# Patient Record
Sex: MALE | Race: BLACK OR AFRICAN AMERICAN | NOT HISPANIC OR LATINO | Employment: OTHER | URBAN - METROPOLITAN AREA
[De-identification: names, ages, dates, MRNs, and addresses within clinical notes are randomized per-mention and may not be internally consistent; named-entity substitution may affect disease eponyms.]

---

## 2022-03-30 ENCOUNTER — TELEPHONE (OUTPATIENT)
Dept: TRANSPLANT | Facility: CLINIC | Age: 72
End: 2022-03-30

## 2022-03-30 NOTE — TELEPHONE ENCOUNTER
"Patient returned call, he reports that he and his wife are on the call (Dolores Qureshi), that they are understanding that he may not proceed with a referral unless he has a potential living donor. They state that his stepson (Uriel) is interested in this possibility, in addition to his wife. The patient is aware the next step will be to request insurance authorization, and then he will be contacted to schedule initial testing. He is aware that he will need a caregiver present, and that more than one person may sign up as a possible donor.     Contacted patient to review initial intake information. Patient reports the followin. Can you walk up a flight of stairs without getting short of breath or stopping? Absolutely ("I was out doing the yard")  2. Can you walk one block without getting short of breath or having to stop? Yes  3. Do you use oxygen? No  4. Do you use a cane, walker, or wheel chair to assist in mobility? No   5. Have you been hospitalized or had recent surgery in the last 6 months? -    A. Stroke - no   B. Heart surgery or heart catheterization - no   C. Broken bone - no  6. Do you have any cuts, open sores (ulcers), or wounds anywhere on your body? If yes, where and for how long? (no)  7. Do you go to dialysis? What days? Dialysis nightly, off on  night   8. Preferred appointment day?  probably  9. Caregiver? Wife Shawn (and Uriel dunham)  10. Best time to call? Anytime after 10 am    Patient is aware the next steps will include completing records and compliance verification. Patient is aware once provider review and insurance authorization is received we will contact patient to schedule initial visit. Patient is aware that initial visit will begin prior to / at 7 am and will conclude at approximately 3 pm on date of appointment. All questions answered at this time.    _________________________________  Unable to reach patient, attempted x 2. Unable to leave voicemail. " "Contacted FERNANDEZ SUNSHINE (Jessie), she states the patient is very interested in transplant, that he maybe could ask a Jew member about donation. She stated "you would not believe his age if you met him", that he "takes very good care of himself".     Plan agreed upon by FERNANDEZ SUNSHINE: she will attempt to reach patient, and requests a letter be sent regarding decline if no living donor (due to age and comorbidities). All questions answered, phone number to transplant center provided to follow up with this coordinator. Status pending.   ----- Message from Shira Epperson MD sent at 3/30/2022  2:02 PM CDT -----  Regarding: RE: please advise if ok to proceed with referral to RR  Not candidate if pt has no living donor.   ----- Message -----  From: Cinthya Cuadra  Sent: 3/30/2022   1:04 PM CDT  To: Shira Epperson MD  Subject: RE: please advise if ok to proceed with refe#    5 months on dialysis (PD), no living donor noted on referral.    Thank you for reviewing!  ----- Message -----  From: Shira Epperson MD  Sent: 3/30/2022  12:19 PM CDT  To: Cinthya Cuadra  Subject: RE: please advise if ok to proceed with refe#    his CHF is not really bad at all. We can evaluate the patient.  Does he have any living donor? Is he on HD? If so how many years?   ----- Message -----  From: Cinthya Cuadra  Sent: 3/30/2022  12:15 PM CDT  To: Shira Epperson MD  Subject: please advise if ok to proceed with referral#    Dr. Epperson,     This patient is a 71 year old male with a past medical history including DM, HTN, CHF (though most recent echo 3/28/21 with EF 60-65% and "normal" right atrium, no noted measured of RVSP), Hepatitis C Cirhhosis post interferon treatment. (Also very remote use of cocaine noted.)    Is it ok to proceed with referral for kidney transplant? Thank you, he does have records in [employer]/care everywhere.    Per policy, can he be declined for medical history "CHF" Heart disease and diabetes?                "

## 2022-04-06 ENCOUNTER — TELEPHONE (OUTPATIENT)
Dept: TRANSPLANT | Facility: CLINIC | Age: 72
End: 2022-04-06

## 2022-04-19 ENCOUNTER — TELEPHONE (OUTPATIENT)
Dept: TRANSPLANT | Facility: CLINIC | Age: 72
End: 2022-04-19
Payer: COMMERCIAL

## 2022-04-26 ENCOUNTER — OUTSIDE PLACE OF SERVICE (OUTPATIENT)
Dept: NEPHROLOGY | Facility: CLINIC | Age: 72
End: 2022-04-26
Payer: COMMERCIAL

## 2022-04-28 DIAGNOSIS — Z76.82 ORGAN TRANSPLANT CANDIDATE: Primary | ICD-10-CM

## 2022-04-29 ENCOUNTER — TELEPHONE (OUTPATIENT)
Dept: TRANSPLANT | Facility: CLINIC | Age: 72
End: 2022-04-29
Payer: COMMERCIAL

## 2022-05-01 ENCOUNTER — OUTSIDE PLACE OF SERVICE (OUTPATIENT)
Dept: NEPHROLOGY | Facility: CLINIC | Age: 72
End: 2022-05-01
Payer: COMMERCIAL

## 2022-05-01 PROCEDURE — 90966 ESRD HOME PT SERV P MO 20+: CPT | Mod: ,,, | Performed by: INTERNAL MEDICINE

## 2022-05-01 PROCEDURE — 90966 PR ESRD SERVICES, HOME DIALYSIS, PER MONTH, 20+ YR OLD: ICD-10-PCS | Mod: ,,, | Performed by: INTERNAL MEDICINE

## 2022-05-25 NOTE — PROGRESS NOTES
Spoke to patient to complete his history for his upcoming RR appointment his step son Uriel will come with him to his appointment he is aware that he have to bring a small breakfast/snack to eat after blood is drawn he will not need a

## 2022-05-26 ENCOUNTER — HOSPITAL ENCOUNTER (OUTPATIENT)
Dept: RADIOLOGY | Facility: HOSPITAL | Age: 72
Discharge: HOME | End: 2022-05-26
Attending: NURSE PRACTITIONER
Payer: COMMERCIAL

## 2022-05-26 ENCOUNTER — TELEPHONE (OUTPATIENT)
Dept: TRANSPLANT | Facility: CLINIC | Age: 72
End: 2022-05-26
Payer: COMMERCIAL

## 2022-05-26 ENCOUNTER — OFFICE VISIT (OUTPATIENT)
Dept: TRANSPLANT | Facility: CLINIC | Age: 72
End: 2022-05-26
Payer: COMMERCIAL

## 2022-05-26 VITALS
HEIGHT: 67 IN | DIASTOLIC BLOOD PRESSURE: 91 MMHG | TEMPERATURE: 97 F | SYSTOLIC BLOOD PRESSURE: 202 MMHG | WEIGHT: 143.75 LBS | RESPIRATION RATE: 16 BRPM | HEART RATE: 72 BPM | BODY MASS INDEX: 22.56 KG/M2 | OXYGEN SATURATION: 97 %

## 2022-05-26 DIAGNOSIS — Z76.82 ORGAN TRANSPLANT CANDIDATE: ICD-10-CM

## 2022-05-26 DIAGNOSIS — N18.6 ESRD ON HEMODIALYSIS: ICD-10-CM

## 2022-05-26 DIAGNOSIS — R77.2 ELEVATED AFP: ICD-10-CM

## 2022-05-26 DIAGNOSIS — Z99.2 DIABETES MELLITUS DUE TO UNDERLYING CONDITION, CONTROLLED, WITH CHRONIC KIDNEY DISEASE ON CHRONIC DIALYSIS, UNSPECIFIED WHETHER LONG TERM INSULIN USE: ICD-10-CM

## 2022-05-26 DIAGNOSIS — I15.0 RENOVASCULAR HYPERTENSION: ICD-10-CM

## 2022-05-26 DIAGNOSIS — Z99.2 ESRD ON HEMODIALYSIS: ICD-10-CM

## 2022-05-26 DIAGNOSIS — Z01.818 PRE-TRANSPLANT EVALUATION FOR CHRONIC KIDNEY DISEASE: Primary | ICD-10-CM

## 2022-05-26 DIAGNOSIS — N18.6 DIABETES MELLITUS DUE TO UNDERLYING CONDITION, CONTROLLED, WITH CHRONIC KIDNEY DISEASE ON CHRONIC DIALYSIS, UNSPECIFIED WHETHER LONG TERM INSULIN USE: ICD-10-CM

## 2022-05-26 DIAGNOSIS — D63.1 ANEMIA IN ESRD (END-STAGE RENAL DISEASE): ICD-10-CM

## 2022-05-26 DIAGNOSIS — N18.6 ANEMIA IN ESRD (END-STAGE RENAL DISEASE): ICD-10-CM

## 2022-05-26 DIAGNOSIS — E08.22 DIABETES MELLITUS DUE TO UNDERLYING CONDITION, CONTROLLED, WITH CHRONIC KIDNEY DISEASE ON CHRONIC DIALYSIS, UNSPECIFIED WHETHER LONG TERM INSULIN USE: ICD-10-CM

## 2022-05-26 PROCEDURE — 72170 XR PELVIS ROUTINE AP: ICD-10-PCS | Mod: 26,TXP,, | Performed by: RADIOLOGY

## 2022-05-26 PROCEDURE — 72170 X-RAY EXAM OF PELVIS: CPT | Mod: TC,TXP

## 2022-05-26 PROCEDURE — 76700 US ABDOMEN COMPLETE: ICD-10-PCS | Mod: 26,TXP,, | Performed by: RADIOLOGY

## 2022-05-26 PROCEDURE — 99999 PR PBB SHADOW E&M-EST. PATIENT-LVL V: CPT | Mod: PBBFAC,TXP,, | Performed by: NURSE PRACTITIONER

## 2022-05-26 PROCEDURE — 99205 PR OFFICE/OUTPT VISIT, NEW, LEVL V, 60-74 MIN: ICD-10-PCS | Mod: S$GLB,TXP,, | Performed by: NURSE PRACTITIONER

## 2022-05-26 PROCEDURE — 76770 US EXAM ABDO BACK WALL COMP: CPT | Mod: 26,TXP,, | Performed by: RADIOLOGY

## 2022-05-26 PROCEDURE — 99205 OFFICE O/P NEW HI 60 MIN: CPT | Mod: S$GLB,TXP,, | Performed by: NURSE PRACTITIONER

## 2022-05-26 PROCEDURE — 76700 US EXAM ABDOM COMPLETE: CPT | Mod: 26,TXP,, | Performed by: RADIOLOGY

## 2022-05-26 PROCEDURE — 93978 US DOPP ILIACS BILATERAL: ICD-10-PCS | Mod: 26,TXP,, | Performed by: RADIOLOGY

## 2022-05-26 PROCEDURE — 99203 PR OFFICE/OUTPT VISIT, NEW, LEVL III, 30-44 MIN: ICD-10-PCS | Mod: S$GLB,TXP,, | Performed by: TRANSPLANT SURGERY

## 2022-05-26 PROCEDURE — 71046 X-RAY EXAM CHEST 2 VIEWS: CPT | Mod: TC,TXP

## 2022-05-26 PROCEDURE — 99203 OFFICE O/P NEW LOW 30 MIN: CPT | Mod: S$GLB,TXP,, | Performed by: TRANSPLANT SURGERY

## 2022-05-26 PROCEDURE — 71046 X-RAY EXAM CHEST 2 VIEWS: CPT | Mod: 26,TXP,, | Performed by: RADIOLOGY

## 2022-05-26 PROCEDURE — 76700 US EXAM ABDOM COMPLETE: CPT | Mod: TC,TXP

## 2022-05-26 PROCEDURE — 99999 PR PBB SHADOW E&M-EST. PATIENT-LVL V: ICD-10-PCS | Mod: PBBFAC,TXP,, | Performed by: NURSE PRACTITIONER

## 2022-05-26 PROCEDURE — 71046 XR CHEST PA AND LATERAL: ICD-10-PCS | Mod: 26,TXP,, | Performed by: RADIOLOGY

## 2022-05-26 PROCEDURE — 76770 US EXAM ABDO BACK WALL COMP: CPT | Mod: TC,TXP

## 2022-05-26 PROCEDURE — 93978 VASCULAR STUDY: CPT | Mod: TC,TXP

## 2022-05-26 PROCEDURE — 93978 VASCULAR STUDY: CPT | Mod: 26,TXP,, | Performed by: RADIOLOGY

## 2022-05-26 PROCEDURE — 72170 X-RAY EXAM OF PELVIS: CPT | Mod: 26,TXP,, | Performed by: RADIOLOGY

## 2022-05-26 PROCEDURE — 76770 US RETROPERITONEAL COMPLETE: ICD-10-PCS | Mod: 26,TXP,, | Performed by: RADIOLOGY

## 2022-05-26 RX ORDER — VALSARTAN 160 MG/1
160 TABLET ORAL DAILY
COMMUNITY

## 2022-05-26 RX ORDER — GLYBURIDE 2.5 MG/1
1.25 TABLET ORAL DAILY
COMMUNITY
Start: 2021-12-14 | End: 2023-03-15 | Stop reason: ALTCHOICE

## 2022-05-26 RX ORDER — GABAPENTIN 600 MG/1
600 TABLET ORAL 2 TIMES DAILY
COMMUNITY
Start: 2022-05-16

## 2022-05-26 RX ORDER — HYDRALAZINE HYDROCHLORIDE 100 MG/1
100 TABLET, FILM COATED ORAL 2 TIMES DAILY
COMMUNITY
Start: 2022-05-04

## 2022-05-26 RX ORDER — ACETAMINOPHEN 500 MG
1 TABLET ORAL DAILY
COMMUNITY

## 2022-05-26 RX ORDER — QUETIAPINE FUMARATE 25 MG/1
25 TABLET, FILM COATED ORAL NIGHTLY
COMMUNITY
Start: 2022-05-06 | End: 2023-03-15 | Stop reason: ALTCHOICE

## 2022-05-26 RX ORDER — ASPIRIN 81 MG/1
81 TABLET ORAL DAILY
COMMUNITY

## 2022-05-26 RX ORDER — PANTOPRAZOLE SODIUM 40 MG/1
40 TABLET, DELAYED RELEASE ORAL DAILY
COMMUNITY
Start: 2022-04-18

## 2022-05-26 RX ORDER — FERROUS GLUCONATE 324(38)MG
324 TABLET ORAL
COMMUNITY

## 2022-05-26 RX ORDER — CLONIDINE HYDROCHLORIDE 0.2 MG/1
0.2 TABLET ORAL 2 TIMES DAILY
COMMUNITY
Start: 2022-04-25 | End: 2023-03-15 | Stop reason: ALTCHOICE

## 2022-05-26 RX ORDER — TIZANIDINE 4 MG/1
4 TABLET ORAL 3 TIMES DAILY
COMMUNITY
Start: 2022-04-18

## 2022-05-26 RX ORDER — AMLODIPINE BESYLATE 10 MG/1
10 TABLET ORAL DAILY
COMMUNITY
Start: 2022-01-20

## 2022-05-26 RX ORDER — PIOGLITAZONEHYDROCHLORIDE 15 MG/1
15 TABLET ORAL DAILY
COMMUNITY
Start: 2022-05-24

## 2022-05-26 RX ORDER — BUMETANIDE 2 MG/1
2 TABLET ORAL DAILY
COMMUNITY
Start: 2021-06-28

## 2022-05-26 RX ORDER — LANOLIN ALCOHOL/MO/W.PET/CERES
800 CREAM (GRAM) TOPICAL DAILY
COMMUNITY

## 2022-05-26 RX ORDER — CARVEDILOL 25 MG/1
12.5 TABLET ORAL 2 TIMES DAILY WITH MEALS
COMMUNITY

## 2022-05-26 RX ORDER — CARVEDILOL 12.5 MG/1
12.5 TABLET ORAL 2 TIMES DAILY
COMMUNITY
Start: 2022-05-16 | End: 2022-05-26

## 2022-05-26 RX ORDER — ATORVASTATIN CALCIUM 20 MG/1
20 TABLET, FILM COATED ORAL DAILY
COMMUNITY
Start: 2022-02-21

## 2022-05-26 NOTE — PROGRESS NOTES
PHARM.D. PRE-TRANSPLANT NOTE:    This patient's medication therapy was evaluated as part of his pre-transplant evaluation.      The following general pharmacologic concerns were noted: aspirin may increase the risk of santos-op bleeding    The following concerns for post-operative pain management were noted: would continue tizandine and gabapentin post-op    The following pharmacologic concerns related to HCV therapy were noted: none      This patient's medication profile was reviewed for considerations for DAA Hepatitis C therapy:    [x]  No current inducers of CYP 3A4 or PGP  [x]  No amiodarone on this patient's EMR profile in the last 24 months  [x]  No past or current atrial fibrillation on this patient's EMR profile       Current Outpatient Medications   Medication Sig Dispense Refill    amLODIPine (NORVASC) 10 MG tablet Take 10 mg by mouth once daily at 6am.      aspirin (ECOTRIN) 81 MG EC tablet Take 81 mg by mouth once daily at 6am.      atorvastatin (LIPITOR) 20 MG tablet Take 20 mg by mouth once daily.      bumetanide (BUMEX) 2 MG tablet Take 2 mg by mouth once daily at 6am.      carvediloL (COREG) 25 MG tablet Take 25 mg by mouth 2 (two) times daily with meals.      cholecalciferol, vitamin D3, (VITAMIN D3) 50 mcg (2,000 unit) Cap capsule Take 1 capsule by mouth once daily at 6am.      cloNIDine (CATAPRES) 0.2 MG tablet Take 0.2 mg by mouth 2 (two) times a day.      ferrous gluconate (FERGON) 324 MG tablet Take 324 mg by mouth daily with breakfast.      gabapentin (NEURONTIN) 600 MG tablet Take 600 mg by mouth 3 (three) times daily.      glyBURIDE (DIABETA) 2.5 MG tablet Take 1.25 mg by mouth once daily.      hydrALAZINE (APRESOLINE) 100 MG tablet Take 100 mg by mouth 2 (two) times daily.      magnesium oxide (MAG-OX) 400 mg (241.3 mg magnesium) tablet Take 800 mg by mouth once daily.      pantoprazole (PROTONIX) 40 MG tablet Take 40 mg by mouth 2 (two) times a day.      pioglitazone (ACTOS)  15 MG tablet Take 15 mg by mouth once daily.      QUEtiapine (SEROQUEL) 25 MG Tab Take 25 mg by mouth every evening.      tiZANidine (ZANAFLEX) 4 MG tablet Take 4 mg by mouth 3 (three) times daily.      valsartan (DIOVAN) 160 MG tablet Take 160 mg by mouth once daily at 6am.       No current facility-administered medications for this visit.           I am available for consultation and can be contacted, as needed by the other members of the Transplant team.
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
## 2022-05-26 NOTE — TELEPHONE ENCOUNTER
Reviewed pt transplant labs.  Notified dialysis unit dietitian of the following abnormal labs via fax and requested their most recent nutrition note on this pt.  Once this note is received it will be scanned into pt's chart.    Ha1c 7.9  Glu 212

## 2022-05-26 NOTE — LETTER
May 27, 2022        Slick Shelton  [address] [place] [address]  Phone: [phone]  Fax: [phone]             [facility] 1st Fl  [address]  [facility] [address]  Phone: [phone]   Patient: Claudio Daugherty   MR Number: [id number]   YOB: 1950   Date of Visit: 5/26/2022       Dear Dr. Slick Shelton    Thank you for referring Claudio Daugherty to me for evaluation. Attached you will find relevant portions of my assessment and plan of care.    If you have questions, please do not hesitate to call me. I look forward to following Cluadio Daugherty along with you.    Sincerely,    Radha Clement, NP    Enclosure    If you would like to receive this communication electronically, please contact [email or web address] or [phone] to request [employer] Link access.    [employer] is a tool which provides read-only access to select patient information with whom you have a relationship. Its easy to use and provides real time access to review your patients record including encounter summaries, notes, results, and demographic information.    If you feel you have received this communication in error or would no longer like to receive these types of communications, please e-mail [email or web address]

## 2022-05-26 NOTE — PROGRESS NOTES
INITIAL PATIENT EDUCATION NOTE    Mr. Claudio Daugherty was seen in pre-kidney transplant clinic for evaluation for kidney, kidney/pancreas or pancreas only transplant.  The patient attended a an individual video education session that discussed/reviewed the following aspects of transplantation: evaluation and selection committee process, UNOS waitlist management/multiple listings, types of organs offered (KDPI < 85%, KDPI > 85%, PHS risk, DCD, HCV+, HIV+ for HIV+ recipients and enbloc/dual), financial aspects, surgical procedures, dietary instruction pre- and post-transplant, health maintenance pre- and post-transplant, post-transplant hospitalization and outpatient follow-up, potential to participate in a research protocol, and medication management and side effects.  A question and answer session was provided after the presentation.    The patient was seen by all members of the multi-disciplinary team to include: Nephrologist/PA, Surgeon, , Transplant Coordinator, , Pharmacist and Dietician (if applicable).    The patient reviewed and signed all consents for evaluation which were witnessed and sent to scanning into the EPIC chart.    The patient was given an education book and plan for further evaluation based on his individual assessment.      Reviewed program requirement for complete COVID vaccination with documentation prior to listing.  COVID education information reviewed with patient.     The patient was informed that the transplant team would manage immediate post op pain. If the patient requires long term pain management, they will need to have that pain management addressed by their PCP or previous provider who wrote for long term pain medicines.    The patient was encouraged to call with any questions or concerns.

## 2022-05-26 NOTE — LETTER
May 26, 2022    Claudio Daugherty  [address] [place] [address]             Dear Dr. Slick Shelton, Primary Doctor No    Patient: Claudio Daugherty   MR Number: [id number]   YOB: 1950     A battery of tests must be done to determine if you are in suitable health to undergo a kidney transplant.  All  the recommended studies must be completed and received by the transplant team before you can be presented to the transplant selection committee. Once all your evaluation is complete the committee will then decide if you are a suitable transplant candidate.  The following studies need to be obtained at home:    ___Mammography: ICD-10 code Z12.31.    ___Gynecologic exam: The need for a pap smear will be determined by gynecologist.    _X__Colonoscopy: This is a required screening test for all adults age 45 or above or those considered at risk for colon cancer. ICD-10 code Z12.11.    _X__Cardiac stress test: ICD-10 code N19. We request you to have a stress test to determine if you have any evidence of blockages in your heart.  We usually recommend a nuclear stress test or dobutamine stress echo, given most patients cannot walk on a treadmill long enough to achieve their target heart rate.     _X__Cardiac PET Stress Test:  ICD-10 code Z91.89, Z01.810 and I 25.10 Age >50 and DM>10 yrs. Age > 50 and dialysis duration > 5 yrs. CAD, s/p PCI or CABG. If unable to obtain, please refer to nuclear stress test above.     _X__2-D echo with color flow doppler: ICD-10 code N19. We need to look at the heart valves and heart muscle, and determine pulmonary artery pressures.      _X__Cardiology consult: We are asking that your cardiologist clear you for transplant surgery and maximize your medical management.  We also need to note if there are special  management strategies that need to be used during your transplant event, especially since we routinely use IV fluids to help the new kidney function at its  best.  Also, your heart doctor needs to know that the average wait for a kidney transplant can be as long as 3-5 years.  Thus, we not only ask for a preoperative clearance, but also optimal management of your heart  (for example: lipids, high blood pressure, heart failure, etc.). Please determine which stress test is best for the patient.    _X__Hepatology consult: We are asking that your hepatologist clear you for transplant surgery and maximize your medical management.  We also need to note if there are special  management strategies that need to be used during your transplant event. Please provide clearance due to history of Hepatitis C, liver biopsy, and liver staging (cirrhosis?). Recent AFP 10.7 per records review. Please know the average wait for a kidney transplant can be as long as 3-5 years.  Thus, we not only ask for a preoperative clearance, but also optimal management.    You and your doctor should feel free to contact us at any time, if there are questions or concerns about these tests or the transplant evaluation process.    Sincerely,    Sophy Robert MD  Medical Director, Kidney & Kidney/Pancreas Transplantation      [facility]  [address] [place] [id number]  [phone] tel  [phone] fax    Cc: Dr. Slick Shelton, [facility], Dr. Ramin Tejada

## 2022-05-27 ENCOUNTER — DOCUMENTATION ONLY (OUTPATIENT)
Dept: TRANSPLANT | Facility: CLINIC | Age: 72
End: 2022-05-27
Payer: COMMERCIAL

## 2022-05-27 ENCOUNTER — TELEPHONE (OUTPATIENT)
Dept: TRANSPLANT | Facility: CLINIC | Age: 72
End: 2022-05-27
Payer: COMMERCIAL

## 2022-05-27 NOTE — PROGRESS NOTES
Transplant Recipient Adult Psychosocial Assessment    Claudio Daugherty   [place] [address]  Telephone Information:   Mobile [phone]   Home  [phone] (home)  Work  There is no work phone number on file.  E-mail  No e-mail address on record    Sex: male  YOB: 1950  Age: 71 y.o.    Encounter Date: 2022  [place] Citizen: yes  Primary Language: English   Needed: no    Emergency Contact:  Name: Dolores Cha  Relationship: significant other and have been together for 30+ years  Address: same as patient  Phone Numbers:  [phone] (mobile)    Family/Social Support:   Number of dependents/: none  Marital history: in long term relationship with s/o for over 30 yrs  Other family dynamics:  Parents are ; one sister and one brother who live in [place], [place].  States they keep in touch with one another.  Pt has two adult biological children and one adult non-biological son who he raised.    Household Composition:  Name: Claudio Daugherty  Age: 71  Relationship: patient  Does person drive? yes    Name: Dolores Cha  Age: 65  Relationship: significant other  Does person drive? yes    Name: Uriel Cha  Age: 33  Relationship: son  Does person drive? yes    Do you and your caregivers have access to reliable transportation? yes     PRIMARY CAREGIVER: Patient currently has no primary caregiver. Patient was accompanied by his son who, although supportive, stated he will be unable to commit to 24/7 care.  Pt's s/o is disabled and physically unable to fulfill caregiver role.  Pt and son stated they will explore caregiving possibilities with other family members.  They verbalized understanding that having a solid, reliable caregiver plan is required for transplant listing.  Pt and son agreed to contact transplant sw when/if caregiving plan is established.     provided in-depth information to patient and caregiver regarding pre- and post-transplant  caregiver role.   strongly encourages patient and caregiver to have concrete plan regarding post-transplant care giving, including back-up caregiver(s) to ensure care giving needs are met as needed.    Patient and Caregiver states understanding all aspects of caregiver role/commitment and is able/willing/committed to being caregiver to the fullest extent necessary.    Patient and Caregiver verbalizes understanding of the education provided today and caregiver responsibilities.         remains available. Patient and Caregiver agree to contact  in a timely manner if concerns arise.      Able to take time off work without financial concerns: no, son states he has to work or does not get paid.  Pt and son instructed to speak with other family members, friends, etc to determine who can assist.  Also instructed to contact txp sw to update caregiver status.     Additional Significant Others who will Assist with Transplant:    No one at this time.  See above.     Living Will: no  Healthcare Power of : no  Advance Directives on file: <<no information> per medical record.  Verbally reviewed LW/HCPA information.   provided patient with copy of LW/HCPA documents and provided education on completion of forms.    Living Donors: No. Education and resource information given to patient.    Highest Education Level: High School (9-12) or GED  Reading Ability: Pt stated he does not read well at all.  States he requires assistance with written information.   Reports difficulty with: reading, writing and hearing  Learns Best By:  Hands on     Status: no  VA Benefits: no     Working for Income: No  If no, reason not working: Disability  Patient is disabled.  Prior to disability, patient  was employed as as a  and  ..    Spouse/Significant Other Employment: wife is disabled.  Son cooks at a restaurant and is waiting to be called back to work on fishing  boat.    Disabled: yes: date disability began: , due to: multiple medical problems:   HTN, arthritis, Hep C.    Monthly Income:  SS Disability: $1400.00  Able to afford all costs now and if transplanted, including medications: yes  Patient and Caregiver verbalizes understanding of personal responsibilities related to transplant costs and the importance of having a financial plan to ensure that patients transplant costs are fully covered.       provided fundraising information/education. Patient and Caregiververbalizes understanding.   remains available.    Insurance:   Payer/Plan Subscr  Sex Relation Sub. Ins. ID Effective Group Num   1. [employer] * [place] * 1950 Male Self [id number]                                   [address]     Primary Insurance (for UNOS reporting): Public Insurance - Medicare & Choice  Secondary Insurance (for UNOS reporting): None  Patient and Caregiver verbalizes clear understanding that patient may experience difficulty obtaining and/or be denied insurance coverage post-surgery. This includes and is not limited to disability insurance, life insurance, health insurance, burial insurance, long term care insurance, and other insurances.      Patient and Caregiver also reports understanding that future health concerns related to or unrelated to transplantation may not be covered by patient's insurance.  Resources and information provided and reviewed.     Patient and Caregiver provides verbal permission to release any necessary information to outside resources for patient care and discharge planning.  Resources and information provided are reviewed.      Dialysis Adherence: Patient reports he started PD in 2021.  He stated he is compliant with treatments and appointments.  Compliance check has been sent.    Infusion Service: patient utilizing? no  Home Health: patient utilizing? no  DME: PD equipment, blood  "sugar monitor, walker, w/c  Pulmonary/Cardiac Rehab: denies   ADLS:  Pt states ability to independently accomplish all adls.    Adherence:   Pt states current and expected compliance with all healthcare recommendations..  Adherence education and counseling provided.     Per History Section:  Past Medical History:   Diagnosis Date    Bell palsy     Bell's palsy     CHF (congestive heart failure)     Diabetes mellitus     Diabetes mellitus, type 2     Disorder of kidney and ureter     Encounter for blood transfusion     Hepatitis     Hepatitis C     Hyperlipidemia     Hypertension     Impotence of organic origin     Proteinuria     Seborrheic dermatitis     Substance abuse      Social History     Tobacco Use    Smoking status: Never Smoker    Smokeless tobacco: Never Used   Substance Use Topics    Alcohol use: Not Currently     Social History     Substance and Sexual Activity   Drug Use Yes    Types: Cocaine    Comment: hx remote use of cocaine      Social History     Substance and Sexual Activity   Sexual Activity Yes    Partners: Female       Per Today's Psychosocial:  Tobacco: Pt stated he smoked one p every other day since age 17 and quit two years ago..  Alcohol: none, patient denies any use.  Illicit Drugs/Non-prescribed Medications: patient stated he quit smoking crack cocaine 3-4 yrs ago..    Patient and Caregiver states clear understanding of the potential impact of substance use as it relates to transplant candidacy and is aware of possible random substance screening.  Substance abstinence/cessation counseling, education and resources provided and reviewed.     Arrests/DWI/Treatment/Rehab: arrest for assault in past-no probation or pending charges at this time.    Psychiatric History:    Mental Health: Pt stated he had taken clonopin "for years", but was unable to explain his symptoms or diagnosis.  He stated he would "go off", but the clonopin would "calm me down". Pt reports recently " taken off clonopin by MD and wants it back.  He was switched to another drug.  Psychiatrist/Counselor: PCP  Medications:  Long term use of Clonopin, switched to Seroquel.  AVS states pt seroquel is prescribed for sleep.  Pt states he does not like how it made him feel.    Suicide/Homicide Issues: Pt denies current or past suicidal/homicidal ideation.   Safety at home: Pt denies any home safety concerns.    Knowledge: Patient and Caregiver states having clear understanding and realistic expectations regarding the potential risks and potential benefits of organ transplantation and organ donation and agrees to discuss with health care team members and support system members, as well as to utilize available resources and express questions and/or concerns in order to further facilitate the pt informed decision-making.  Resources and information provided and reviewed.    Patient and Caregiver is aware of [facility]'s affiliation and/or partnership with agencies in home health care, LTAC, SNF, [facility], and other hospitals and clinics.    Understanding: Patient and Caregiver reports having a clear understanding of the many lifetime commitments involved with being a transplant recipient, including costs, compliance, medications, lab work, procedures, appointments, concrete and financial planning, preparedness, timely and appropriate communication of concerns, abstinence (ETOH, tobacco, illicit non-prescribed drugs), adherence to all health care team recommendations, support system and caregiver involvement, appropriate and timely resource utilization and follow-through, mental health counseling as needed/recommended, and patient and caregiver responsibilities.  Social Service Handbook, resources and detailed educational information provided and reviewed.  Educational information provided.    Patient and Caregiver also reports current and expected compliance with health care regime and states having a clear understanding of the  importance of compliance.      Patient and Caregiver reports a clear understanding that risks and benefits may be involved with organ transplantation and with organ donation.       Patient and Caregiver also reports clear understanding that psychosocial risk factors may affect patient, and include but are not limited to feelings of depression, generalized anxiety, anxiety regarding dependence on others, post traumatic stress disorder, feelings of guilt and other emotional and/or mental concerns, and/or exacerbation of existing mental health concerns.  Detailed resources provided and discussed.      Patient and Caregiver agrees to access appropriate resources in a timely manner as needed and/or as recommended, and to communicate concerns appropriately.  Patient and Caregiver also reports a clear understanding of treatment options available.     Patient and Caregiver received education in a group setting.   reviewed education, provided additional information, and answered questions.    Feelings or Concerns: Pt and son report concerns about finding a donor and caregivers    Coping: Identify Patient & Caregiver Strategies to [place]:   1. Currently & Pre-transplant - Pt reports support from wife and son, regular Adventism involvement, enjoys TV and cutting grass.   2. At the time of surgery - family support and deana   3. During post-Transplant & Recovery Period - family support and deana    Goals: get off dialysis.  Patient referred to Vocational Rehabilitation.    Interview Behavior: Patient and Caregiver presents as alert and oriented x 4, pleasant, good eye contact, well groomed, recall good, concentration/judgement good, average intelligence, calm, communicative, cooperative and asking and answering questions appropriately. Pt was accompanied by son who presented as supportive of pt's pursuit of transplant.          Transplant Social Work - Candidacy  Assessment/Plan:     Psychosocial Suitability:  Based on  psychosocial risk factors, patient presents as high risk, due to inadequate caregiver plan.  Pt has support from son, adequate insurance and transportation..    Recommendations/Additional Comments: Recommend pt establish caregiver plan and contact sw with with plan.  Pt would benefit from [facility] post txp if listed.     Michelle Farris, GARCIA

## 2022-05-27 NOTE — PROGRESS NOTES
Transplant Surgery  Kidney Transplant Recipient Evaluation    Referring Physician: Slick Shelton  Current Nephrologist: Slick Shelton    Subjective:     Reason for Visit: evaluate transplant candidacy    History of Present Illness: Claudio Daugherty is a 71 y.o. year old male undergoing transplant evaluation.    Dialysis History: Claudio is on peritoneal dialysis.      Transplant History: N/A    Etiology of Renal Disease: Diabetes Mellitus - Type II (based on medical records from referral).    External provider notes reviewed: Yes    Review of Systems  Objective:     Physical Exam:  Constitutional:   Vitals reviewed: yes   Well-nourished and well-groomed: yes  Eyes:   Sclerae icteric: no   Extraocular movements intact: yes  GI:    Bowel sounds normal: yes   Tenderness: no    If yes, quadrant/location: not applicable   Palpable masses: no    If yes, quadrant/location: not applicable   Hepatosplenomegaly: no   Ascites: no   Hernia: no    If yes, type/location: not applicable   Surgical scars: yes    If yes, type/location: laparoscopic port sites  Left sided PD catheter  not applicable  Resp:   Effort normal: yes   Breath sounds normal: yes    CV:   Regular rate and rhythm: yes   Heart sounds normal: yes   Femoral pulses normal: yes   Extremities edematous: no  Skin:   Rashes or lesions present: no    If yes, describe:not applicable   Jaundice:: no    Musculoskeletal:   Gait normal: yes   Strength normal: yes  Psych:   Oriented to person, place, and time: yes   Affect and mood normal: yes    Additional comments: not applicable    Diagnostics:  The following labs have been reviewed: CBC  CMP  PT  INR  The following radiology images have been independently reviewed and interpreted: Pelvic Xray  Iliac doppler    Counseling: We provided Claudio Daugherty with a group education session today.  We discussed kidney transplantation at length with him, including risks, potential complications, and alternatives in the  management of his renal failure.  The discussion included complications related to anesthesia, bleeding, infection, primary nonfunction, and ATN.  I discussed the typical postoperative course, length of hospitalization, the need for long-term immunosuppression, and the need for long-term routine follow-up.  I discussed living-donor and -donor transplantation and the relative advantages and disadvantages of each.  I also discussed average waiting times for both living donation and  donation.  I discussed national and center-specific survival rates.  I also mentioned the potential benefit of multicenter listing to candidates listed with centers within more than one organ procurement organization.  All questions were answered.    Patient advised that it is recommended that all transplant candidates, and their close contacts and household members receive Covid vaccination.    Final determination of transplant candidacy will be made once evaluation is complete and reviewed by the Kidney & Kidney/Pancreas Selection Committee.    Coronavirus disease (COVID-19) caused by severe acute respiratory virus coronavirus 2 (SARS-C0V 2) is associated with increased mortality in solid organ transplant recipients (SOT) compared to non-transplant patients. Vaccine responses to vaccination are depressed against SARS-CoV2 compared to normal individuals but improve with third vaccination doses. Vaccination prior to SOT provides both the best opportunity for transplant candidates to develop protective immunity and to reduce the risk of serious COVID19 infections post transplantation. Organ transplant candidates at [facility] will be required to receive SARS-CoV-2 vaccination prior to being listed with a an active status, whenever possible. Exceptions will be made for disability related reasons or for sincerely held Moravian beliefs.          Transplant Surgery - Candidacy    Assessment/Plan:   Claudio Daugherty has end stage renal disease (ESRD) on dialysis. I see no surgical contraindication to placing a kidney transplant. Based on available information, Claudio Daugherty is a suitable kidney transplant candidate.     Abdominal Habitus: Not prohibitive.     Vascular/Technical Concerns: None based on exam or available imaging at this time.  No history of vascular disease, no femoral catheters, no lower extremity grafts, no iliofemoral venous thromboembolism. The patient is not on anticoagulation.     Urologic Concerns: None based on available information. Patient reports normal volume of urine per day.  No history of recurrent UTI, no apparent obstructive symptoms or voiding dysfunction, no history of self catheterization    Other surgical considerations/concerns:  Leukopenia and thrombocytopenia.  Remote history of HCV, reportedly treated in 90s.  PCR previously negative.  Hepatology evaluation pending.     Additional testing to be completed according to the Written Order Guidelines for Adult Pre-kidney and Pancreas Transplant Evaluation (KI-02).  Interpretation of tests and discussion of patient management involves all members of the multidisciplinary transplant team.    Zoltan Ball MD

## 2022-06-01 ENCOUNTER — OUTSIDE PLACE OF SERVICE (OUTPATIENT)
Dept: NEPHROLOGY | Facility: CLINIC | Age: 72
End: 2022-06-01
Payer: COMMERCIAL

## 2022-06-01 PROCEDURE — 90966 ESRD HOME PT SERV P MO 20+: CPT | Mod: NTX,,, | Performed by: INTERNAL MEDICINE

## 2022-06-01 PROCEDURE — 90966 PR ESRD SERVICES, HOME DIALYSIS, PER MONTH, 20+ YR OLD: ICD-10-PCS | Mod: NTX,,, | Performed by: INTERNAL MEDICINE

## 2022-06-08 ENCOUNTER — TELEPHONE (OUTPATIENT)
Dept: TRANSPLANT | Facility: CLINIC | Age: 72
End: 2022-06-08
Payer: COMMERCIAL

## 2022-06-08 NOTE — TELEPHONE ENCOUNTER
----- Message from Michelle Farris LCSW sent at 6/8/2022 12:45 PM CDT -----  Regarding: FW: Speak with office  Contact: Claudio Langford,  I believe this is a nursing call.  I never ask for A1C levels!  Michelle  ----- Message -----  From: Brittchristopher Abner  Sent: 6/8/2022  11:33 AM CDT  To: [facility] Kidney Transplant Social Workers  Subject: Speak with office                                Consult/Advisory:           Name Of Caller: Claudio  Contact Preference?: [phone]         Coordinator Name: Live DUMONT           What is the nature of the call?:    Pt is calling to speak with Michelle in regards to his A1C. Pt says he didn't have it at the time of evaluation but he has it now and would like to report it. He says he went and got it from his doctor and said that it should have been faxed by .    Date: 04/04/22  A1C: 8.2    As always, thank you for all that you do for our patients!

## 2022-06-08 NOTE — TELEPHONE ENCOUNTER
Returned pt's call. Mad him aware we have a hemoglobin A1c from May and we don't need another one right now. In the meantime, the pt stated his colonoscopy will be sent over.

## 2022-06-10 ENCOUNTER — TELEPHONE (OUTPATIENT)
Dept: TRANSPLANT | Facility: CLINIC | Age: 72
End: 2022-06-10
Payer: COMMERCIAL

## 2022-06-10 NOTE — PROGRESS NOTES
"Dialysis compliance found under "Media Tab" > personal history or pt questionnaire > dialysis compliance. Compliance appears acceptable    "

## 2022-07-01 ENCOUNTER — OUTSIDE PLACE OF SERVICE (OUTPATIENT)
Dept: NEPHROLOGY | Facility: CLINIC | Age: 72
End: 2022-07-01
Payer: COMMERCIAL

## 2022-07-01 PROCEDURE — 90966 PR ESRD SERVICES, HOME DIALYSIS, PER MONTH, 20+ YR OLD: ICD-10-PCS | Mod: NTX,,, | Performed by: INTERNAL MEDICINE

## 2022-07-01 PROCEDURE — 90966 ESRD HOME PT SERV P MO 20+: CPT | Mod: NTX,,, | Performed by: INTERNAL MEDICINE

## 2022-08-01 ENCOUNTER — OUTSIDE PLACE OF SERVICE (OUTPATIENT)
Dept: NEPHROLOGY | Facility: CLINIC | Age: 72
End: 2022-08-01
Payer: COMMERCIAL

## 2022-08-01 PROCEDURE — 90966 PR ESRD SERVICES, HOME DIALYSIS, PER MONTH, 20+ YR OLD: ICD-10-PCS | Mod: NTX,,, | Performed by: INTERNAL MEDICINE

## 2022-08-01 PROCEDURE — 90966 ESRD HOME PT SERV P MO 20+: CPT | Mod: NTX,,, | Performed by: INTERNAL MEDICINE

## 2022-09-01 ENCOUNTER — OUTSIDE PLACE OF SERVICE (OUTPATIENT)
Dept: NEPHROLOGY | Facility: CLINIC | Age: 72
End: 2022-09-01
Payer: COMMERCIAL

## 2022-09-01 PROCEDURE — 90966 ESRD HOME PT SERV P MO 20+: CPT | Mod: ,,, | Performed by: INTERNAL MEDICINE

## 2022-09-01 PROCEDURE — 90966 PR ESRD SERVICES, HOME DIALYSIS, PER MONTH, 20+ YR OLD: ICD-10-PCS | Mod: ,,, | Performed by: INTERNAL MEDICINE

## 2022-09-20 ENCOUNTER — TELEPHONE (OUTPATIENT)
Dept: TRANSPLANT | Facility: CLINIC | Age: 72
End: 2022-09-20
Payer: COMMERCIAL

## 2022-09-20 NOTE — TELEPHONE ENCOUNTER
Patient called regarding 30 day letter. Pt states that he hasn't had any testing done. He thought he was complete when he came for his initial appts and had to pay $80. I informed pt that he was sent a letter back in June listing the remainder test. Pt states that he will schedule appts and call me with appt dates, locations and physicians. Re faxed orders and letter to [facility] and Nephrologist.

## 2022-10-01 ENCOUNTER — OUTSIDE PLACE OF SERVICE (OUTPATIENT)
Dept: NEPHROLOGY | Facility: CLINIC | Age: 72
End: 2022-10-01
Payer: COMMERCIAL

## 2022-10-01 PROCEDURE — 90966 PR ESRD SERVICES, HOME DIALYSIS, PER MONTH, 20+ YR OLD: ICD-10-PCS | Mod: ,,, | Performed by: INTERNAL MEDICINE

## 2022-10-01 PROCEDURE — 90966 ESRD HOME PT SERV P MO 20+: CPT | Mod: ,,, | Performed by: INTERNAL MEDICINE

## 2022-10-18 ENCOUNTER — TELEPHONE (OUTPATIENT)
Dept: TRANSPLANT | Facility: CLINIC | Age: 72
End: 2022-10-18
Payer: COMMERCIAL

## 2022-10-18 NOTE — TELEPHONE ENCOUNTER
10/18/22 Chart reviewed for committee presenation  Hep C Ab+: Known Hep C previously treated,2010  liver bx/ path reviewed,  noting  fibrosis staging FIII, (outside record) 10/10/22 ABD US hepatic echogenicity is slightly i ncreased, suggestive of steatosis,    ,AFP WNL, pending  hepatology clearance --OK to present while waiting, if patient cannot get apt with local hepatology, we can do Hepatology at [facility] for clearance if patient is agreeable.   Radha Clement NPKeithC

## 2022-10-25 ENCOUNTER — TELEPHONE (OUTPATIENT)
Dept: TRANSPLANT | Facility: CLINIC | Age: 72
End: 2022-10-25
Payer: COMMERCIAL

## 2022-10-25 NOTE — TELEPHONE ENCOUNTER
" Occupational Therapy Daily Progress Note        Patient: Sabi Haynes   : 1939  Diagnosis/ICD-10 Code:  Aftercare following surgery of the musculoskeletal system [Z47.89]  Referring practitioner: Fuad Squires MD  Date of Initial Visit: Type: THERAPY  Noted: 2021  Today's Date: 2021  Patient seen for 5 sessions             Subjective   Sabi Haynes reports: \"It was really bothering me over the weekend\"    Objective          Neurological Testing     Additional Neurological Details  WNL B shoulders.    Active Range of Motion     Left Elbow   Normal active range of motion    Right Elbow   Normal active range of motion    Left Wrist   Normal active range of motion    Right Wrist   Normal active range of motion    Additional Active Range of Motion Details  Pt able to make full, composite fist.    Passive Range of Motion     Right Shoulder   Flexion: 85 degrees   Abduction: 85 degrees   External rotation 0°: 15 degrees     Additional Passive Range of Motion Details  Pt does not feel pain with passive flexion but pt displays tightness that limits further movement.        See Exercise, Manual, and Modality Logs for complete treatment.       Assessment/Plan  Discussed eliminating table top stretch from HEP. Pt verbalizes understanding.  Progress per Plan of Care           Timed:  Manual Therapy:    15     mins  58852;  Therapeutic Exercise:    8     mins  51024;     Neuromuscular Karen:    0    mins  53485;    Therapeutic Activity:     0     mins  27921;     Ultrasound:     0     mins  22491;    Electrical Stimulation:    0     mins  10160;    Untimed:  Electrical Stimulation:    0     mins  44075 ( );  Fluidotherapy     0     mins  35137  Hot/cold pack     0     mins  00911    Timed Treatment:   23   mins   Total Treatment:     23   mins        Mo Hall OT  Occupational Therapist  " Called patient to inquire about Hepatology visit. Needs clearance for transplant. No voicemail set up. Left voicemail on Ms. Bernal's phone asking for a call back.

## 2022-11-01 ENCOUNTER — OUTSIDE PLACE OF SERVICE (OUTPATIENT)
Dept: NEPHROLOGY | Facility: CLINIC | Age: 72
End: 2022-11-01
Payer: COMMERCIAL

## 2022-11-01 PROCEDURE — 90966 ESRD HOME PT SERV P MO 20+: CPT | Mod: ,,, | Performed by: INTERNAL MEDICINE

## 2022-11-01 PROCEDURE — 90966 PR ESRD SERVICES, HOME DIALYSIS, PER MONTH, 20+ YR OLD: ICD-10-PCS | Mod: ,,, | Performed by: INTERNAL MEDICINE

## 2022-11-03 ENCOUNTER — SOCIAL WORK (OUTPATIENT)
Dept: TRANSPLANT | Facility: CLINIC | Age: 72
End: 2022-11-03
Payer: COMMERCIAL

## 2022-11-03 NOTE — PROGRESS NOTES
SW attempted to call pt x2.  No answer and no option to leave message.  Attempted to call pt's s/o.  Left vm.

## 2022-11-03 NOTE — PROGRESS NOTES
" GAIL returned pt's call back.  He stated he has not formulated alternate caregiver plan.  Stated his wife who has had surgery for a "broken back", hip replacement and now awaiting breast implant removal surgery can be his caregiver.  Pt reports wife is not driving at this time due to being sick from leaky implants.  Pt's son arrived at the home during our conversation (Uriel [phone]).  He stated they had not spoken with any other family members or thought about alternate caregiving plan.  Uriel admitted he remembers gail speaking very clearly about the need to have strong caregiver support and plan prior to committee approval.  He said they need "a couple more days" to make a plan.  Additionally, cost of lodging has presented as a barrier due to potential unavailability of lodging.  Son stated they will call back on Monday after determining if insurance will assist with lodging and talking to other family members about lodging.      "

## 2022-11-03 NOTE — PROGRESS NOTES
" SW attempted to follow up on patient's caregiver plan.  He completed initial evaluation on 5/30/22 and reported no identified caregiver or plan.  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 SW will attempt again later.     "

## 2022-11-07 ENCOUNTER — TELEPHONE (OUTPATIENT)
Dept: TRANSPLANT | Facility: CLINIC | Age: 72
End: 2022-11-07
Payer: COMMERCIAL

## 2022-11-07 NOTE — TELEPHONE ENCOUNTER
Txp sw returned call from MONY, Jessie Bearden.  Jessie states she is trying to get clarification regarding pt's request for assistance. Pt requesting DUSW make hotel reservation for patient's transplant.  (Pt is not listed for transplant and does not have surgery scheduled.)  Please see note from Storm ZELAYA dated 11/3/22.  Txberyl reynolds had spoken to patient and son regarding caregiver plan and potential need to afford lodging post transplant.    Txp gail and HERNANW spoke at length about caregiving plan and lodging.  Txp gail stated need for pt's potential caregivers to contact transplant center and speak with sw for evaluation, obtain education and determine whether caregiver is able/willing to provide care (as part of a caregiver team) post transplant.  Additionally, pt and family should begin fundraising efforts in order to have funds for transplant related expenses.  MONY stated she will provide additional clarification to pt regarding needed actions to promote good outcomes post transplant.    MONY stated understanding and will speak again with pt regarding preparation for transplant.

## 2022-11-07 NOTE — TELEPHONE ENCOUNTER
----- Message from John Alcantar sent at 11/7/2022 11:59 AM CST -----  Regarding: Speak to SW  Contact: Jessie Sofia [facility] called in requesting to speak with JONG Martinez. Reason unspecified. Requesting a call back to discuss further.         Contact: [phone] (Mon-Wed best days to reach)

## 2022-11-07 NOTE — TELEPHONE ENCOUNTER
----- Message from John Alcantar sent at 11/7/2022 11:59 AM CST -----  Regarding: Speak to SW  Contact: Jessie Sofia [facility] called in requesting to speak with JONG Martinez. Reason unspecified. Requesting a call back to discuss further.         Contact: [phone] (Mon-Wed best days to reach)

## 2022-11-09 ENCOUNTER — TELEPHONE (OUTPATIENT)
Dept: TRANSPLANT | Facility: CLINIC | Age: 72
End: 2022-11-09
Payer: COMMERCIAL

## 2022-11-09 ENCOUNTER — COMMITTEE REVIEW (OUTPATIENT)
Dept: TRANSPLANT | Facility: CLINIC | Age: 72
End: 2022-11-09
Payer: COMMERCIAL

## 2022-11-09 NOTE — LETTER
November 9, 2022    Claudio Daugherty  [address] [place] [address]    Dear Claudio Daugherty:  MRN: [id number]    It is the duty of the [facility] Selection Committee to determine which patients are candidates for a transplant. For this reason, our committee has the difficult task of evaluating patients to determine which ones have the greatest chance of having a successful transplant. We are aware of the magnitude of this responsibility, and we approach it with reverence and humility.    It is with regret I inform you that you are not approved as a transplant candidate due to  lack of a caregiver plan and hepatology clearance .  Based on this review, we have determined that at this time, you are not a candidate for a transplant at [facility].  You may be re-referred once you have a stable caregiver plan and hepatology clearance.     The selection committee carefully considers each patient's transplant candidacy and determines whether it is safe to proceed with transplantation on a case-by-case basis using established selection criteria.  At present, the risk of proceeding with an elective transplant surgery has become too high.                                                                               Although the selection committee believes you are not a suitable transplant candidate, you have the option to be evaluated at other transplant centers who may have different selection criteria.  You may request your [facility] records be sent to any center of your choice by contacting our Medical Records Department at [phone].                                                                               Attached is a letter from the United Network for Organ Sharing (UNOS).  It describes the services and information offered to patients by UNOS and the Organ Procurement and Transplant Network.    The [facility] Selection [facility] sincerely wishes you the best and remains available to answer  any questions.  Please do not hesitate to contact our pre-transplant office if we can assist you in any other way.                                                                               Sincerely,      Sophy Robert MD  Medical Director, Kidney & Kidney/Pancreas Transplantation    CC:  Dr. Slick Shelton          [facility]    Encl: UNOS Letter               The Organ Procurement and Transplantation Network   Toll-free patient services line: Your resource for organ transplant information     If you have a question regarding your own medical care, you always should call your transplant hospital first. However, for general organ transplant-related information, you can call the Organ Procurement and Transplantation Network (OPTN) toll-free patient services line at [phone].     Anyone, including potential transplant candidates, candidates, recipients, family members, friends, living donors, and donor family members, can call this number to:     Talk about organ donation, living donation, the transplant process, the donation process, and transplant policies.   Get a free patient information kit with helpful booklets, waiting list and transplant information, and a list of all transplant hospitals.   Ask questions about the OPTN website (https://optn.transplant.hrsa.gov/), the United Network for Organ Sharings (UNOS) website (https://unos.org/), or the UNOS website for living donors and transplant recipients. (https://www.transplantliving.org/).   Learn how the OPTN can help you.   Talk about any concerns that you may have with a transplant hospital.     The nations transplant system, the OPTN, is managed under federal contract by the United Network for Organ Sharing (UNOS), which is a non-profit charitable organization. The OPTN helps create and define organ sharing policies that make the best use of donated organs. This process continuously evaluating new advances and  discoveries so policies can be adapted to best serve patients waiting for transplants. To do so, the OPTN works closely with transplant professionals, transplant patients, transplant candidates, donor families, living donors, and the public. All transplant programs and organ procurement organizations throughout the country are OPTN members and are obligated to follow the policies the OPTN creates for allocating organs.     The OPTN also is responsible for:   Providing educational material for patients, the public, and professionals.   Raising awareness of the need for donated organs and tissue.   Coordinating organ procurement, matching, and placement.   Collecting information about every organ transplant and donation that occurs in the [place].     Remember, you should contact your transplant hospital directly if you have questions or concerns about your own medical care including medical records, work-up progress, and test results.     We are not your transplant hospital, and our staff will not be able to answer questions about your case, so please keep your transplant hospitals phone number handy.   However, while you research your transplant needs and learn as much as you can about transplantation and donation, we welcome your call to our toll-free patient services line at [phone].

## 2022-11-09 NOTE — TELEPHONE ENCOUNTER
Notified the patient of the committee's decision. Patient voiced understanding and all questions were answered.

## 2022-12-01 ENCOUNTER — OUTSIDE PLACE OF SERVICE (OUTPATIENT)
Dept: NEPHROLOGY | Facility: CLINIC | Age: 72
End: 2022-12-01
Payer: COMMERCIAL

## 2022-12-01 PROCEDURE — 90966 PR ESRD SERVICES, HOME DIALYSIS, PER MONTH, 20+ YR OLD: ICD-10-PCS | Mod: ,,, | Performed by: INTERNAL MEDICINE

## 2022-12-01 PROCEDURE — 90966 ESRD HOME PT SERV P MO 20+: CPT | Mod: ,,, | Performed by: INTERNAL MEDICINE

## 2022-12-23 ENCOUNTER — SOCIAL WORK (OUTPATIENT)
Dept: TRANSPLANT | Facility: CLINIC | Age: 72
End: 2022-12-23
Payer: COMMERCIAL

## 2022-12-23 NOTE — PROGRESS NOTES
"Dialysis compliance found under "Media" tab->"dialysis compliance".  Compliance reported as satisfactory.     "

## 2023-01-01 ENCOUNTER — OUTSIDE PLACE OF SERVICE (OUTPATIENT)
Dept: NEPHROLOGY | Facility: CLINIC | Age: 73
End: 2023-01-01
Payer: COMMERCIAL

## 2023-01-01 PROCEDURE — 90966 PR ESRD SERVICES, HOME DIALYSIS, PER MONTH, 20+ YR OLD: ICD-10-PCS | Mod: ,,, | Performed by: INTERNAL MEDICINE

## 2023-01-01 PROCEDURE — 90966 ESRD HOME PT SERV P MO 20+: CPT | Mod: ,,, | Performed by: INTERNAL MEDICINE

## 2023-01-19 ENCOUNTER — SOCIAL WORK (OUTPATIENT)
Dept: TRANSPLANT | Facility: CLINIC | Age: 73
End: 2023-01-19
Payer: COMMERCIAL

## 2023-01-19 ENCOUNTER — TELEPHONE (OUTPATIENT)
Dept: TRANSPLANT | Facility: CLINIC | Age: 73
End: 2023-01-19
Payer: COMMERCIAL

## 2023-01-19 NOTE — PROGRESS NOTES
JONG returned call to dialysis sw, Dinora Bearden who had questions about patient's status in referral process.  Dinora asked if pt needed new referral for transplant evaluation.  Pt was declined on 11/9/22 due to lack of caregiver plan and testing.  After extensive discussion regarding the referral process, Dinora verbalized understanding that pt requires a new referral.  She stated testing results have been acquired and now patient's wife has recovered from her surgery so can act as primary caregiver post transplant. JONG asked Dinora to remind patient that his primary caregiver must accompany him to future transplant appointments.  She verbalized understanding and agreement.  SW remains available.

## 2023-01-19 NOTE — TELEPHONE ENCOUNTER
Pt's SW Jessie called regarding reopening his transplant case. I informed her that she would need to send in a new referral for the pt. She states that she spoke with the JONG Martinez and was told that the pt wouldn't need to be re referred. I informed her that I send Michelle a message and she can further discuss this issue with her. Jessie verbalized understanding. Staff message sent to Michelle.

## 2023-01-27 ENCOUNTER — TELEPHONE (OUTPATIENT)
Dept: TRANSPLANT | Facility: CLINIC | Age: 73
End: 2023-01-27
Payer: COMMERCIAL

## 2023-02-01 ENCOUNTER — OUTSIDE PLACE OF SERVICE (OUTPATIENT)
Dept: NEPHROLOGY | Facility: CLINIC | Age: 73
End: 2023-02-01
Payer: COMMERCIAL

## 2023-02-01 PROCEDURE — 90966 PR ESRD SERVICES, HOME DIALYSIS, PER MONTH, 20+ YR OLD: ICD-10-PCS | Mod: ,,, | Performed by: INTERNAL MEDICINE

## 2023-02-01 PROCEDURE — 90966 ESRD HOME PT SERV P MO 20+: CPT | Mod: ,,, | Performed by: INTERNAL MEDICINE

## 2023-02-14 ENCOUNTER — TELEPHONE (OUTPATIENT)
Dept: TRANSPLANT | Facility: CLINIC | Age: 73
End: 2023-02-14
Payer: COMMERCIAL

## 2023-03-01 ENCOUNTER — OUTSIDE PLACE OF SERVICE (OUTPATIENT)
Dept: NEPHROLOGY | Facility: CLINIC | Age: 73
End: 2023-03-01
Payer: COMMERCIAL

## 2023-03-01 PROCEDURE — 90966 PR ESRD SERVICES, HOME DIALYSIS, PER MONTH, 20+ YR OLD: ICD-10-PCS | Mod: ,,, | Performed by: INTERNAL MEDICINE

## 2023-03-01 PROCEDURE — 90966 ESRD HOME PT SERV P MO 20+: CPT | Mod: ,,, | Performed by: INTERNAL MEDICINE

## 2023-03-10 ENCOUNTER — TELEPHONE (OUTPATIENT)
Dept: TRANSPLANT | Facility: CLINIC | Age: 73
End: 2023-03-10
Payer: COMMERCIAL

## 2023-03-13 ENCOUNTER — TELEPHONE (OUTPATIENT)
Dept: TRANSPLANT | Facility: CLINIC | Age: 73
End: 2023-03-13
Payer: COMMERCIAL

## 2023-03-13 NOTE — TELEPHONE ENCOUNTER
MA notes per adherence form     FOR THE PAST THREE MONTHS:    0-AMA's  0-No-shows    No concerns with care giving, transportation, or mental health    Brought over to clinic to be scanned in.    Nancy Bucio  Abdominal Transplant MA

## 2023-03-15 ENCOUNTER — DOCUMENTATION ONLY (OUTPATIENT)
Dept: TRANSPLANT | Facility: CLINIC | Age: 73
End: 2023-03-15
Payer: COMMERCIAL

## 2023-03-15 ENCOUNTER — OFFICE VISIT (OUTPATIENT)
Dept: TRANSPLANT | Facility: CLINIC | Age: 73
End: 2023-03-15
Payer: COMMERCIAL

## 2023-03-15 VITALS
SYSTOLIC BLOOD PRESSURE: 132 MMHG | HEART RATE: 74 BPM | OXYGEN SATURATION: 98 % | WEIGHT: 155 LBS | TEMPERATURE: 97 F | HEIGHT: 66 IN | BODY MASS INDEX: 24.91 KG/M2 | RESPIRATION RATE: 16 BRPM | DIASTOLIC BLOOD PRESSURE: 63 MMHG

## 2023-03-15 DIAGNOSIS — N18.6 ANEMIA IN ESRD (END-STAGE RENAL DISEASE): ICD-10-CM

## 2023-03-15 DIAGNOSIS — N18.6 DIABETES MELLITUS DUE TO UNDERLYING CONDITION, CONTROLLED, WITH CHRONIC KIDNEY DISEASE ON CHRONIC DIALYSIS, UNSPECIFIED WHETHER LONG TERM INSULIN USE: ICD-10-CM

## 2023-03-15 DIAGNOSIS — I15.0 RENOVASCULAR HYPERTENSION: ICD-10-CM

## 2023-03-15 DIAGNOSIS — Z99.2 DIABETES MELLITUS DUE TO UNDERLYING CONDITION, CONTROLLED, WITH CHRONIC KIDNEY DISEASE ON CHRONIC DIALYSIS, UNSPECIFIED WHETHER LONG TERM INSULIN USE: ICD-10-CM

## 2023-03-15 DIAGNOSIS — Z99.2 ESRD ON HEMODIALYSIS: ICD-10-CM

## 2023-03-15 DIAGNOSIS — D63.1 ANEMIA IN ESRD (END-STAGE RENAL DISEASE): ICD-10-CM

## 2023-03-15 DIAGNOSIS — N18.6 ESRD ON HEMODIALYSIS: ICD-10-CM

## 2023-03-15 DIAGNOSIS — Z01.818 PRE-TRANSPLANT EVALUATION FOR CHRONIC KIDNEY DISEASE: Primary | ICD-10-CM

## 2023-03-15 DIAGNOSIS — E08.22 DIABETES MELLITUS DUE TO UNDERLYING CONDITION, CONTROLLED, WITH CHRONIC KIDNEY DISEASE ON CHRONIC DIALYSIS, UNSPECIFIED WHETHER LONG TERM INSULIN USE: ICD-10-CM

## 2023-03-15 PROCEDURE — 99999 PR PBB SHADOW E&M-EST. PATIENT-LVL V: ICD-10-PCS | Mod: PBBFAC,TXP,, | Performed by: NURSE PRACTITIONER

## 2023-03-15 PROCEDURE — 99204 PR OFFICE/OUTPT VISIT, NEW, LEVL IV, 45-59 MIN: ICD-10-PCS | Mod: S$GLB,TXP,, | Performed by: PHYSICIAN ASSISTANT

## 2023-03-15 PROCEDURE — 99999 PR PBB SHADOW E&M-EST. PATIENT-LVL V: CPT | Mod: PBBFAC,TXP,, | Performed by: NURSE PRACTITIONER

## 2023-03-15 PROCEDURE — 99215 PR OFFICE/OUTPT VISIT, EST, LEVL V, 40-54 MIN: ICD-10-PCS | Mod: S$GLB,TXP,, | Performed by: NURSE PRACTITIONER

## 2023-03-15 PROCEDURE — 99215 OFFICE O/P EST HI 40 MIN: CPT | Mod: S$GLB,TXP,, | Performed by: NURSE PRACTITIONER

## 2023-03-15 PROCEDURE — 99204 OFFICE O/P NEW MOD 45 MIN: CPT | Mod: S$GLB,TXP,, | Performed by: PHYSICIAN ASSISTANT

## 2023-03-15 RX ORDER — CLONAZEPAM 1 MG/1
1 TABLET ORAL 2 TIMES DAILY PRN
COMMUNITY

## 2023-03-15 NOTE — PROGRESS NOTES
INITIAL PATIENT EDUCATION NOTE    Mr. Claudio Daugherty was seen in pre-kidney transplant clinic for evaluation for kidney, kidney/pancreas or pancreas only transplant.  The patient attended a an individual video education session that discussed/reviewed the following aspects of transplantation: evaluation including diagnostic and laboratory testing,( Chemistries, Hematology, Serologies including HIV and Hepatitis and HLA) required for transplantation and selection committee process, UNOS waitlist management/multiple listings, types of organs offered (KDPI < 85%, KDPI > 85%, PHS risk, DCD, HCV+, HIV+ for HIV+ recipients and enbloc/dual), financial aspects, surgical procedures, dietary instruction pre- and post-transplant, health maintenance pre- and post-transplant, post-transplant hospitalization and outpatient follow-up, potential to participate in a research protocol, and medication management and side effects.  A question and answer session was provided after the presentation.    The patient was seen by all members of the multi-disciplinary team to include: Nephrologist/SHIREEN, Surgeon, , Transplant Coordinator, , Pharmacist and Dietician (if applicable).    The patient reviewed and signed all consents for evaluation which were witnessed and sent to scanning into the [facility] chart.    The patient was given an education book and plan for further evaluation based on his individual assessment.      Reviewed program requirement for complete COVID vaccination with documentation prior to listing.  COVID education information reviewed with patient. Patient encouraged to be up to date on all vaccinations.       The patient was informed that the transplant team would manage immediate post op pain. If the patient requires long term pain management, they will need to have that pain management addressed by their PCP or previous provider who wrote for long term pain medicines.    The patient  was encouraged to call with any questions or concerns.
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
## 2023-03-15 NOTE — PROGRESS NOTES
PHARM.D. PRE-TRANSPLANT NOTE:    This patient's medication therapy was evaluated as part of his pre-transplant evaluation.      The following general pharmacologic concerns were noted: none    The following concerns for post-operative pain management were noted: none    The following pharmacologic concerns related to HCV therapy were noted: atorvastatin to be switched if on HepC treatment      This patient's medication profile was reviewed for considerations for DAA Hepatitis C therapy: atorvastatin to be switched if on a hepC treatment     [x]  No current inducers of CYP 3A4 or PGP  [x]  No amiodarone on this patient's EMR profile in the last 24 months  [x]  No past or current atrial fibrillation on this patient's EMR profile       Current Outpatient Medications   Medication Sig Dispense Refill    amLODIPine (NORVASC) 10 MG tablet Take 10 mg by mouth once daily at 6am.      aspirin (ECOTRIN) 81 MG EC tablet Take 81 mg by mouth once daily at 6am.      atorvastatin (LIPITOR) 20 MG tablet Take 20 mg by mouth once daily.      bumetanide (BUMEX) 2 MG tablet Take 2 mg by mouth once daily at 6am.      carvediloL (COREG) 25 MG tablet Take 12.5 mg by mouth 2 (two) times daily with meals.      cholecalciferol, vitamin D3, (VITAMIN D3) 50 mcg (2,000 unit) Cap capsule Take 1 capsule by mouth once daily at 6am.      clonazePAM (KLONOPIN) 1 MG tablet Take 1 mg by mouth 2 (two) times daily as needed for Anxiety.      ferrous gluconate (FERGON) 324 MG tablet Take 324 mg by mouth daily with breakfast.      gabapentin (NEURONTIN) 600 MG tablet Take 600 mg by mouth 2 (two) times daily.      magnesium oxide (MAG-OX) 400 mg (241.3 mg magnesium) tablet Take 800 mg by mouth once daily.      pantoprazole (PROTONIX) 40 MG tablet Take 40 mg by mouth once daily.      pioglitazone (ACTOS) 15 MG tablet Take 15 mg by mouth once daily.      tiZANidine (ZANAFLEX) 4 MG tablet Take 4 mg by mouth 3 (three) times daily.      valsartan (DIOVAN) 160 MG  tablet Take 160 mg by mouth once daily at 6am.      hydrALAZINE (APRESOLINE) 100 MG tablet Take 100 mg by mouth 2 (two) times daily.       No current facility-administered medications for this visit.           I am available for consultation and can be contacted, as needed by the other members of the Transplant team.

## 2023-03-15 NOTE — LETTER
March 17, 2023        Slick Shelton  [address] [place] [address]  Phone: [phone]  Fax: [phone]             [facility]- Transplant [facility]  [address]  [facility] [address]  Phone: [phone]   Patient: Claudio Daugherty   MR Number: [id number]   YOB: 1950   Date of Visit: 3/15/2023       Dear Dr. Slick Shelton    Thank you for referring Claudio Daugherty to me for evaluation. Attached you will find relevant portions of my assessment and plan of care.    If you have questions, please do not hesitate to call me. I look forward to following Claudio Daugherty along with you.    Sincerely,    Elena Wright, NP    Enclosure    If you would like to receive this communication electronically, please contact [email or web address] or [phone] to request Screen Link access.    Screen Link is a tool which provides read-only access to select patient information with whom you have a relationship. Its easy to use and provides real time access to review your patients record including encounter summaries, notes, results, and demographic information.    If you feel you have received this communication in error or would no longer like to receive these types of communications, please e-mail [email or web address]

## 2023-03-15 NOTE — LETTER
March 15, 2023    Claudio Daugherty  [address]  [place] [address]             Dear Dr. Slick Shelton, Primary Doctor No    Patient: Claudio Daugherty   MR Number: [id number]   YOB: 1950     A battery of tests must be done to determine if you are in suitable health to undergo a kidney transplant.  All  the recommended studies must be completed and received by the transplant team before you can be presented to the transplant selection committee. Once all your evaluation is complete the committee will then decide if you are a suitable transplant candidate.  The following studies need to be obtained at home:    ___Mammography: ICD-10 code Z12.31.    ___Gynecologic exam: The need for a pap smear will be determined by gynecologist.    ___Colonoscopy: This is a required screening test for all adults age 45 or above or those considered at risk for colon cancer. ICD-10 code Z12.11.    ___Cardiac stress test: ICD-10 code N19. We request you to have a stress test to determine if you have any evidence of blockages in your heart.  We usually recommend a nuclear stress test or dobutamine stress echo, given most patients cannot walk on a treadmill long enough to achieve their target heart rate.     ___Cardiac PET Stress Test:  ICD-10 code Z91.89, Z01.810 and I 25.10 Age >50 and DM>10 yrs. Age > 50 and dialysis duration > 5 yrs. CAD, s/p PCI or CABG. If unable to obtain, please refer to nuclear stress test above.     _X__2-D echo with color flow doppler: ICD-10 code N19. We need to look at the heart valves and heart muscle, and determine pulmonary artery pressures.      ___Cardiology consult: We are asking that your cardiologist clear you for transplant surgery and maximize your medical management.  We also need to note if there are special  management strategies that need to be used during your transplant event, especially since we routinely use IV fluids to help the new kidney function at its  best.  Also, your heart doctor needs to know that the average wait for a kidney transplant can be as long as 3-5 years.  Thus, we not only ask for a preoperative clearance, but also optimal management of your heart  (for example: lipids, high blood pressure, heart failure, etc.).    You and your doctor should feel free to contact us at any time, if there are questions or concerns about these tests or the transplant evaluation process.    Sincerely,    Sophy Robert MD  Medical Director, Kidney & Kidney/Pancreas Transplantation      [facility]  [address] [place] [id number]  [phone]    Cc: Slick Shelton MD; [facility]

## 2023-03-29 NOTE — PROGRESS NOTES
"Transplant Recipient Adult Psychosocial Assessment    Claudio Daugherty   [place] [address]  Telephone Information:   Mobile [phone]   Home  [phone] (home)  Work  There is no work phone number on file.  E-mail  No e-mail address on record    Sex: male  YOB: 1950  Age: 72 y.o.    Encounter Date: 3/15/2023  [place] Citizen: yes  Primary Language: English   Needed: no    Emergency Contact:  Name: Dolores Cha  Relationship: significant other and have been together for 7+ years  Address: same as patient  Phone Numbers:  [phone] (mobile)    Family/Social Support:   Number of dependents/: none  Marital history: in long term relationship with s/o for over 7+ yrs  Other family dynamics:  Parents are ; one sister and one brother who live in [place], [place].  States they keep in touch with one another.  Pt has two adult biological children and one adult non-biological son who he raised (Claudio Farr-resides in [place] and he reports having a good relationship with son)  Pt also reports having a very good relationship with s/o's adult children: Uriel, Ronel and Alyssa.     Household Composition:  Name: Claudio Daugherty  Age: 72  Relationship: patient  Does person drive? yes    Name: Dolores Cha  Age: 66  Relationship: significant other  Does person drive? yes    Name: Uriel Cha  Age: 34  Relationship: son  Does person drive? yes    Do you and your caregivers have access to reliable transportation? yes     SW explained caregiver role during demographic portion of assessment. Pt's s/o reports "Oh no baby I can't do all that by myself". Pt presented with a rollator due to post surgery recovery. Dolores stressed she is recovering very well and plans to do all she can once the fully recovers.but SW expressed concern and stressed need for additional support. SW inquired about "step-children's" involvement. Pt and s/o reports her two daughters will be able to " assist and mentions one of her dtrs drove pair to clinic. SW requested pt call additional caregiver and have her come to clinic for education.     PRIMARY CAREGIVER: Ronel Marroquin and Dolores Qureshi: [phone] and [phone]. Both potential caregivers were highly engaged and motivated for transplant.      provided in-depth information to patient and caregiver regarding pre- and post-transplant caregiver role.   strongly encourages patient and caregiver to have concrete plan regarding post-transplant care giving, including back-up caregiver(s) to ensure care giving needs are met as needed.    Patient and Caregiver states understanding all aspects of caregiver role/commitment and is able/willing/committed to being caregiver to the fullest extent necessary.    Patient and Caregiver verbalizes understanding of the education provided today and caregiver responsibilities.         remains available. Patient and Caregiver agree to contact  in a timely manner if concerns arise.      Able to take time off work without financial concerns: yes    Additional Significant Others who will Assist with Transplant:    Name: Monica Cha (resides 20mins away)  Age: none provided   Number: [phone]  Relationship: s/o's dtr  Does person drive? 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     Living Will: no  Healthcare Power of : no Pt reports trusting s/o Dolores with medical decisions as needed. SW provided documentation for completion.    Advance Directives on file: <<no information> per medical record.  Verbally reviewed LW/HCPA information.   provided patient with copy of LW/HCPA documents and provided education on completion of forms.    Living Donors: No. Education and resource information given to patient.    Highest Education Level: High School (9-12) or GED  Reading Ability:  Pt stated he does not read well  at all. States he requires assistance with written information.   Reports difficulty with: reading, writing and hearing  Learns Best By:  Hands on     Status: no  VA Benefits: no     Working for Income: No  If no, reason not working: Disability  Patient is disabled.  Prior to disability, patient  was employed as as a  and  .    Spouse/Significant Other Employment: wife is disabled.  Son cooks at a restaurant and is waiting to be called back to work on fishing boat.    Disabled: yes: date disability began: , due to: multiple medical problems:   HTN, arthritis, Hep C.    Monthly Income:  SS Disability: $1400.00  S/O SSD:$2,600  Able to afford all costs now and if transplanted, including medications: yes  Patient and Caregiver verbalizes understanding of personal responsibilities related to transplant costs and the importance of having a financial plan to ensure that patients transplant costs are fully covered.       provided fundraising information/education. Patient and Caregiver verbalizes understanding.   remains available.    Insurance:   Payer/Plan Subscr  Sex Relation Sub. Ins. ID Effective Group Num   1. [employer] * [place] * 1950 Male Self [id number]                                   [address]     Primary Insurance (for UNOS reporting): Public Insurance - Medicare & Choice  Secondary Insurance (for UNOS reporting): None  Patient and Caregiver verbalizes clear understanding that patient may experience difficulty obtaining and/or be denied insurance coverage post-surgery. This includes and is not limited to disability insurance, life insurance, health insurance, burial insurance, long term care insurance, and other insurances.      Patient and Caregiver also reports understanding that future health concerns related to or unrelated to transplantation may not be covered by patient's insurance.  Resources and  information provided and reviewed.     Patient and Caregiver provides verbal permission to release any necessary information to outside resources for patient care and discharge planning.  Resources and information provided are reviewed.      Dialysis Adherence: Patient reports he started PD in November of 2021.  He stated he is compliant with treatments and appointments.  Compliance check has been sent.    Infusion Service: patient utilizing? no  Home Health: patient utilizing? yes Pt reports insurance coordinated HH visits (vitals,etc.)  DME:  PD equipment, blood sugar monitor, walker, w/c  Pulmonary/Cardiac Rehab: denies   ADLS:  Pt states ability to independently accomplish all adls.    Adherence:   Pt states current and expected compliance with all healthcare recommendations. Adherence education and counseling provided.     Per History Section:  Past Medical History:   Diagnosis Date    Bell palsy     Bell's palsy     CHF (congestive heart failure)     Diabetes mellitus     Diabetes mellitus, type 2     Disorder of kidney and ureter     Encounter for blood transfusion     Hepatitis     Hepatitis C     Hyperlipidemia     Hypertension     Impotence of organic origin     Proteinuria     Seborrheic dermatitis     Substance abuse      Social History     Tobacco Use    Smoking status: Never    Smokeless tobacco: Never   Substance Use Topics    Alcohol use: Not Currently     Social History     Substance and Sexual Activity   Drug Use Yes    Types: Cocaine    Comment: hx remote use of cocaine      Social History     Substance and Sexual Activity   Sexual Activity Yes    Partners: Female       Per Today's Psychosocial:  Tobacco:  Pt stated he smoked one pk every other day since age 17 and quit two years ago. .  Alcohol: none, patient denies any use.  Illicit Drugs/Non-prescribed Medications:  patient stated he quit smoking crack cocaine 4-5 yrs ago.    Patient and Caregiver states clear understanding of the potential  "impact of substance use as it relates to transplant candidacy and is aware of possible random substance screening.  Substance abstinence/cessation counseling, education and resources provided and reviewed.     Arrests/DWI/Treatment/Rehab:  arrest for assault in past-no probation or pending charges at this time.    Psychiatric History:    Mental Health:  Pt confirmed he had taken Klonopin "for years". He stated the Klonopin would "calm me down".  Pt reports taken off Klonopin by MD.  SW advised pt to seek PCP assistance for further intervention if desired. Pt verbalized understanding and agreement.  Psychiatrist/Counselor: PCP  Medications:  Long term use of Klonopin, switched to Seroquel but no longer utilizes.Pt states he does not like how it made him feel.    Suicide/Homicide Issues: Pt denies current or past suicidal/homicidal ideation.   Safety at home: Pt denies any home safety concerns.    Knowledge: Patient and Caregiver states having clear understanding and realistic expectations regarding the potential risks and potential benefits of organ transplantation and organ donation and agrees to discuss with health care team members and support system members, as well as to utilize available resources and express questions and/or concerns in order to further facilitate the pt informed decision-making.  Resources and information provided and reviewed.    Patient and Caregiver is aware of [facility]'s affiliation and/or partnership with agencies in home health care, LTAC, SNF, [facility], and other hospitals and clinics.    Understanding: Patient and Caregiver reports having a clear understanding of the many lifetime commitments involved with being a transplant recipient, including costs, compliance, medications, lab work, procedures, appointments, concrete and financial planning, preparedness, timely and appropriate communication of concerns, abstinence (ETOH, tobacco, illicit non-prescribed drugs), adherence to all health " care team recommendations, support system and caregiver involvement, appropriate and timely resource utilization and follow-through, mental health counseling as needed/recommended, and patient and caregiver responsibilities.  Social Service Handbook, resources and detailed educational information provided and reviewed.  Educational information provided.    Patient and Caregiver also reports current and expected compliance with health care regime and states having a clear understanding of the importance of compliance.      Patient and Caregiver reports a clear understanding that risks and benefits may be involved with organ transplantation and with organ donation.       Patient and Caregiver also reports clear understanding that psychosocial risk factors may affect patient, and include but are not limited to feelings of depression, generalized anxiety, anxiety regarding dependence on others, post traumatic stress disorder, feelings of guilt and other emotional and/or mental concerns, and/or exacerbation of existing mental health concerns.  Detailed resources provided and discussed.      Patient and Caregiver agrees to access appropriate resources in a timely manner as needed and/or as recommended, and to communicate concerns appropriately.  Patient and Caregiver also reports a clear understanding of treatment options available.     Patient and Caregiver received education in a group setting.   reviewed education, provided additional information, and answered questions.    Feelings or Concerns: Pt and son report concerns about finding a donor and caregivers    Coping: Identify Patient & Caregiver Strategies to [facility]:   1. Currently & Pre-transplant - Pt reports support from wife and son, regular Sikhism involvement, enjoys TV and cutting grass.   2. At the time of surgery - family support and deana   3. During post-Transplant & Recovery Period - family support and deana    Goals: get off dialysis.  Patient  referred to Vocational Rehabilitation.    Interview Behavior: Patient and Caregiver presents as alert and oriented x 4, pleasant, good eye contact, well groomed, recall good, concentration/judgement good, average intelligence, calm, communicative, cooperative and asking and answering questions appropriately. Pt was accompanied by s/o and Ronel who presented as supportive of pt's pursuit of transplant.          Transplant Social Work - Candidacy  Assessment/Plan:     Psychosocial Suitability:  Based on psychosocial risk factors, patient presents as medium risk, due to concerns with previous caregiver plan. Today ,pt has a suitable caregiver plan and confirmed back up caregiver plan . Pt currently affording all medical copay cost,caregiver plans and willingness to utilize resources as needed.    Recommendations/Additional Comments: SW recommends that pt conduct fundraising to assist pt with pay for cost of medications, food, gas, and other transplant related needs. SW recommends that pt remain aware of potential mental health concerns and contact the team if any concerns arise. SW recommends that pt remain abstinent from tobacco, ETOH, and drug use. SW supports pt's continued dialysis adherence. SW remains available to answer any questions or concerns that arise as the pt moves through the transplant process.      Carmen Cordero, MSW, LMSW

## 2023-03-31 ENCOUNTER — COMMITTEE REVIEW (OUTPATIENT)
Dept: TRANSPLANT | Facility: CLINIC | Age: 73
End: 2023-03-31
Payer: COMMERCIAL

## 2023-03-31 ENCOUNTER — TELEPHONE (OUTPATIENT)
Dept: TRANSPLANT | Facility: CLINIC | Age: 73
End: 2023-03-31
Payer: COMMERCIAL

## 2023-03-31 NOTE — LETTER
March 31, 2023    Claudio Chiuland  [address] [place] [address]    Dear Claudio Daugherty:  MRN: [id number]    Your transplant evaluation was reviewed at the [facility] meeting on 3/31/2023.  It is with regret I inform you that your workup is incomplete and we are unable to determine your transplant candidacy at this time due to you will need Hepatology clearance for history of Hepatitis C and a Computed Tomography (CT) of abdomen and pelvis to assess liver and iliacs ( major vessels that carry blood to the lower part of your body.)  You will be re-presented to the selection committee once pending studies are completed.  You will be notified of the committees decision once we review the new results.    The [facility] Selection Committee carefully considers each patients transplant candidacy using established selection criteria to determine if it is safe to proceed with transplantation for each and every person evaluated.  Although the selection committee believes your workup is incomplete and we are unable to determine your transplant candidacy, you have the right to be evaluated at other transplant centers.  You may request your [facility] records be sent to any center of your choice by contacting our Medical Records Department at [phone].    Attached is a letter from the United Network for Organ Sharing (UNOS).  It describes the services and information offered to patients by UNOS and the Organ Procurement and Transplant Network.    Sincerely,      Sophy Robert MD  Medical Director, Kidney & Kidney/Pancreas Transplantation  Jr:  Enclosure   Cc: Slick Shelton MD         [facility]                The Organ Procurement and Transplantation Network   Toll-free patient services line: Your resource for organ transplant information     If you have a question regarding your own medical care, you always should call your transplant hospital first.  However, for general organ transplant-related information, you can call the Organ Procurement and Transplantation Network (OPTN) toll-free patient services line at 1-160.838.6241.     Anyone, including potential transplant candidates, candidates, recipients, family members, friends, living donors, and donor family members, can call this number to:     Talk about organ donation, living donation, the transplant process, the donation process, and transplant policies.   Get a free patient information kit with helpful booklets, waiting list and transplant information, and a list of all transplant hospitals.   Ask questions about the OPTN website (https://optn.transplant.hrsa.gov/), the United Network for Organ Sharings (UNOS) website (https://unos.org/), or the UNOS website for living donors and transplant recipients. (https://www.transplantliving.org/).   Learn how the OPTN can help you.   Talk about any concerns that you may have with a transplant hospital.     The nations transplant system, the OPTN, is managed under federal contract by the United Network for Organ Sharing (UNOS), which is a non-profit charitable organization. The OPTN helps create and define organ sharing policies that make the best use of donated organs. This process continuously evaluating new advances and discoveries so policies can be adapted to best serve patients waiting for transplants. To do so, the OPTN works closely with transplant professionals, transplant patients, transplant candidates, donor families, living donors, and the public. All transplant programs and organ procurement organizations throughout the country are OPTN members and are obligated to follow the policies the OPTN creates for allocating organs.     The OPTN also is responsible for:   Providing educational material for patients, the public, and professionals.   Raising awareness of the need for donated organs and tissue.   Coordinating organ procurement, matching, and  placement.   Collecting information about every organ transplant and donation that occurs in the [place].     Remember, you should contact your transplant hospital directly if you have questions or concerns about your own medical care including medical records, work-up progress, and test results.     We are not your transplant hospital, and our staff will not be able to answer questions about your case, so please keep your transplant hospitals phone number handy.   However, while you research your transplant needs and learn as much as you can about transplantation and donation, we welcome your call to our toll-free patient services line at [phone].

## 2023-03-31 NOTE — TELEPHONE ENCOUNTER
Notified the patient of the committee's decision. Pt and his wife voiced understanding and all questions were answered. Pt stated he will have hep consult and ct done locally. This Rn explained we will need the cd of the ct.

## 2023-03-31 NOTE — COMMITTEE REVIEW
Native Organ Dx: Diabetes Mellitus - Type II      Unable to determine transplant candidacy at this time due to needs Hepatology clearance ( not GI) regarding history of hepatitis C  , CT of abdomen and pelvis to assess liver and iliacs due to biphasic waveforms on US.      Note written by EAGLE Shah,RN  ===============================================    I was present at the meeting and attest to the decision of the committee.    Jass Rivera  03/31/2023

## 2023-04-01 ENCOUNTER — OUTSIDE PLACE OF SERVICE (OUTPATIENT)
Dept: NEPHROLOGY | Facility: CLINIC | Age: 73
End: 2023-04-01
Payer: COMMERCIAL

## 2023-04-01 PROCEDURE — 90966 PR ESRD SERVICES, HOME DIALYSIS, PER MONTH, 20+ YR OLD: ICD-10-PCS | Mod: ,,, | Performed by: INTERNAL MEDICINE

## 2023-04-01 PROCEDURE — 90966 ESRD HOME PT SERV P MO 20+: CPT | Mod: ,,, | Performed by: INTERNAL MEDICINE

## 2023-04-21 ENCOUNTER — TELEPHONE (OUTPATIENT)
Dept: TRANSPLANT | Facility: CLINIC | Age: 73
End: 2023-04-21
Payer: COMMERCIAL

## 2023-04-21 NOTE — TELEPHONE ENCOUNTER
----- Message from Chandrakant Lora MA sent at 4/21/2023  6:24 AM CDT -----  Regarding: RE: Consult/Advisory  I'll request the CD. It's in the workup comment that I've rcv'd his hepatology clearance.   ----- Message -----  From: Anastasiya Wayne RN  Sent: 4/20/2023   4:18 PM CDT  To: Chandrakant Lora MA  Subject: FW: Consult/Advisory                             Did we rcv hepatology clearance? Will you please request the cd of the CT done at [facility]  ----- Message -----  From: Harshal Savage  Sent: 4/20/2023   2:35 PM CDT  To: [facility] Pre-Kidney Transplant Clinical  Subject: Consult/Advisory                                     Name Of Caller: Alida @ [facility]      Contact Preference: [phone]    Nature of call: Alida @ [facility] calling to speak to coordinator regarding patient status.

## 2023-04-25 ENCOUNTER — TELEPHONE (OUTPATIENT)
Dept: TRANSPLANT | Facility: CLINIC | Age: 73
End: 2023-04-25
Payer: COMMERCIAL

## 2023-04-25 NOTE — TELEPHONE ENCOUNTER
Returned pt's call and confirmed that I've received his CT records. I informed pt that Anastasiya has his chart for review and will contact him for an update.

## 2023-04-25 NOTE — TELEPHONE ENCOUNTER
----- Message from Ese Haley sent at 4/25/2023 11:36 AM CDT -----  Regarding: Return Call  Pt is returning call to [place].      [phone] (home)

## 2023-05-01 ENCOUNTER — OUTSIDE PLACE OF SERVICE (OUTPATIENT)
Dept: NEPHROLOGY | Facility: CLINIC | Age: 73
End: 2023-05-01
Payer: COMMERCIAL

## 2023-05-01 PROCEDURE — 90966 PR ESRD SERVICES, HOME DIALYSIS, PER MONTH, 20+ YR OLD: ICD-10-PCS | Mod: ,,, | Performed by: INTERNAL MEDICINE

## 2023-05-01 PROCEDURE — 90966 ESRD HOME PT SERV P MO 20+: CPT | Mod: ,,, | Performed by: INTERNAL MEDICINE

## 2023-05-04 ENCOUNTER — TELEPHONE (OUTPATIENT)
Dept: TRANSPLANT | Facility: CLINIC | Age: 73
End: 2023-05-04
Payer: COMMERCIAL

## 2023-05-04 NOTE — TELEPHONE ENCOUNTER
----- Message from Arnaldo Cast Jr., MD sent at 5/4/2023  1:46 PM CDT -----  Regarding: RE: ct review  72 year old with significant calcifications.   Possibly prohibitive.   Sending to Dr. Nava for him to evaluate as well.   DS  ----- Message -----  From: Anastasiya Wayne RN  Sent: 5/1/2023   2:33 PM CDT  To: Arnaldo Cast Jr., MD  Subject: ct review                                        Hi Dr. Cast,  Will you please review ct abd/pelvis downloaded in epic and comment if favorable for transplant.    Thank You,  Anastasiya

## 2023-05-05 ENCOUNTER — COMMITTEE REVIEW (OUTPATIENT)
Dept: TRANSPLANT | Facility: CLINIC | Age: 73
End: 2023-05-05
Payer: COMMERCIAL

## 2023-05-05 NOTE — COMMITTEE REVIEW
Native Organ Dx: Diabetes Mellitus - Type II      Not approved for LRD/CAD transplant due to severe vascular calcifications.      Note written by Lalita Chairez RN    ===============================================    I was present at the meeting and attest to the decision of the committee.

## 2023-05-05 NOTE — TELEPHONE ENCOUNTER
Attempted to notify patient of the committee's decision. No answer or option to  leave VM at either contact numbers.

## 2023-05-05 NOTE — LETTER
May 5, 2023    Claudio Daugherty  [address] [place] [address]    Dear Claudio Daugherty:  MRN: [id number]    It is the duty of the [facility] to determine which patients are candidates for a transplant. For this reason, our committee has the difficult task of evaluating patients to determine which ones have the greatest chance of having a successful transplant. We are aware of the magnitude of this responsibility, and we approach it with reverence and humility.    It is with regret I inform you that you are not approved as a transplant candidate due to  severe vascular calcifications .   Based on this review, we have determined that at this time, you are not a candidate for a transplant at [facility].      The selection committee carefully considers each patient's transplant candidacy and determines whether it is safe to proceed with transplantation on a case-by-case basis using established selection criteria.  At present, the risk of proceeding with an elective transplant surgery has become too high.                                                                               Although the selection committee believes you are not a suitable transplant candidate, you have the option to be evaluated at other transplant centers who may have different selection criteria.  You may request your [facility] records be sent to any center of your choice by contacting our Medical Records Department at [phone].                                                                               Attached is a letter from the [employer] ([employer]).  It describes the services and information offered to patients by UN[employer] and the Organ Procurement and Transplant Network.    The [facility] sincerely wishes you the best and remains available to answer any questions.  Please do not hesitate to contact our pre-transplant office if we can assist you in any other  way.                                                                               Sincerely,      Sophy Robert MD  Medical Director, Kidney & Kidney/Pancreas Transplantation    Cc: Dr. Slick Shelton         [facility]              The [employer] Procurement and Transplantation Network   Toll-free patient services line: Your resource for organ transplant information     If you have a question regarding your own medical care, you always should call your transplant hospital first. However, for general organ transplant-related information, you can call the Organ Procurement and Transplantation Network (OPTN) toll-free patient services line at [phone].     Anyone, including potential transplant candidates, candidates, recipients, family members, friends, living donors, and donor family members, can call this number to:     Talk about organ donation, living donation, the transplant process, the donation process, and transplant policies.   Get a free patient information kit with helpful booklets, waiting list and transplant information, and a list of all transplant hospitals.   Ask questions about the OPTN website (https://optn.transplant.hrsa.gov/), the [employer]s (UNOS) website (https://unos.org/), or the [employer] website for living donors and transplant recipients. (https://www.transplantliving.org/).   Learn how the OPTN can help you.   Talk about any concerns that you may have with a transplant hospital.     The nations transplant system, the OPTN, is managed under federal contract by the [employer] (UN[employer]), which is a non-profit charitable organization. The OPTN helps create and define organ sharing policies that make the best use of donated organs. This process continuously evaluating new advances and discoveries so policies can be adapted to best serve patients waiting for transplants. To do so, the OPTN works closely with transplant  professionals, transplant patients, transplant candidates, donor families, living donors, and the public. All transplant programs and organ procurement organizations throughout the country are OPTN members and are obligated to follow the policies the OPTN creates for allocating organs.     The OPTN also is responsible for:   Providing educational material for patients, the public, and professionals.   Raising awareness of the need for donated organs and tissue.   Coordinating organ procurement, matching, and placement.   Collecting information about every organ transplant and donation that occurs in the [place].     Remember, you should contact your transplant hospital directly if you have questions or concerns about your own medical care including medical records, work-up progress, and test results.     We are not your transplant hospital, and our staff will not be able to answer questions about your case, so please keep your transplant hospitals phone number handy.   However, while you research your transplant needs and learn as much as you can about transplantation and donation, we welcome your call to our toll-free patient services line at [phone].

## 2023-05-26 ENCOUNTER — TELEPHONE (OUTPATIENT)
Dept: TRANSPLANT | Facility: CLINIC | Age: 73
End: 2023-05-26
Payer: COMMERCIAL

## 2023-05-26 NOTE — TELEPHONE ENCOUNTER
Returned phone call, no answer, left voicemail to return phone call.      ----- Message from John Alcantar sent at 5/26/2023  9:59 AM CDT -----  Regarding: Case F/U  Contact: [facility] called in requesting an update on patient kidney transplant status.                          Contact: [phone]

## 2023-06-01 ENCOUNTER — OUTSIDE PLACE OF SERVICE (OUTPATIENT)
Dept: NEPHROLOGY | Facility: CLINIC | Age: 73
End: 2023-06-01
Payer: COMMERCIAL

## 2023-06-01 PROCEDURE — 90966 ESRD HOME PT SERV P MO 20+: CPT | Mod: ,,, | Performed by: INTERNAL MEDICINE

## 2023-06-01 PROCEDURE — 90966 PR ESRD SERVICES, HOME DIALYSIS, PER MONTH, 20+ YR OLD: ICD-10-PCS | Mod: ,,, | Performed by: INTERNAL MEDICINE

## 2023-07-01 ENCOUNTER — OUTSIDE PLACE OF SERVICE (OUTPATIENT)
Dept: NEPHROLOGY | Facility: CLINIC | Age: 73
End: 2023-07-01
Payer: COMMERCIAL

## 2023-07-06 PROCEDURE — 99222 1ST HOSP IP/OBS MODERATE 55: CPT | Mod: ,,, | Performed by: INTERNAL MEDICINE

## 2023-07-06 PROCEDURE — 99222 PR INITIAL HOSPITAL CARE,LEVL II: ICD-10-PCS | Mod: ,,, | Performed by: INTERNAL MEDICINE

## 2023-07-07 PROCEDURE — 99232 SBSQ HOSP IP/OBS MODERATE 35: CPT | Mod: ,,, | Performed by: INTERNAL MEDICINE

## 2023-07-07 PROCEDURE — 99232 PR SUBSEQUENT HOSPITAL CARE,LEVL II: ICD-10-PCS | Mod: ,,, | Performed by: INTERNAL MEDICINE

## 2023-07-08 PROCEDURE — 99232 PR SUBSEQUENT HOSPITAL CARE,LEVL II: ICD-10-PCS | Mod: ,,, | Performed by: INTERNAL MEDICINE

## 2023-07-08 PROCEDURE — 99232 SBSQ HOSP IP/OBS MODERATE 35: CPT | Mod: ,,, | Performed by: INTERNAL MEDICINE

## 2023-07-09 PROCEDURE — 99232 SBSQ HOSP IP/OBS MODERATE 35: CPT | Mod: ,,, | Performed by: INTERNAL MEDICINE

## 2023-07-09 PROCEDURE — 99232 PR SUBSEQUENT HOSPITAL CARE,LEVL II: ICD-10-PCS | Mod: ,,, | Performed by: INTERNAL MEDICINE

## 2023-07-10 PROCEDURE — 99232 PR SUBSEQUENT HOSPITAL CARE,LEVL II: ICD-10-PCS | Mod: ,,, | Performed by: INTERNAL MEDICINE

## 2023-07-10 PROCEDURE — 99232 SBSQ HOSP IP/OBS MODERATE 35: CPT | Mod: ,,, | Performed by: INTERNAL MEDICINE

## 2023-07-11 PROCEDURE — 99232 SBSQ HOSP IP/OBS MODERATE 35: CPT | Mod: ,,, | Performed by: INTERNAL MEDICINE

## 2023-07-11 PROCEDURE — 99232 PR SUBSEQUENT HOSPITAL CARE,LEVL II: ICD-10-PCS | Mod: ,,, | Performed by: INTERNAL MEDICINE

## 2023-07-12 PROCEDURE — 99232 SBSQ HOSP IP/OBS MODERATE 35: CPT | Mod: ,,, | Performed by: INTERNAL MEDICINE

## 2023-07-12 PROCEDURE — 99232 PR SUBSEQUENT HOSPITAL CARE,LEVL II: ICD-10-PCS | Mod: ,,, | Performed by: INTERNAL MEDICINE

## 2023-07-18 PROCEDURE — 90966 ESRD HOME PT SERV P MO 20+: CPT | Mod: ,,, | Performed by: INTERNAL MEDICINE

## 2023-07-18 PROCEDURE — 90966 PR ESRD SERVICES, HOME DIALYSIS, PER MONTH, 20+ YR OLD: ICD-10-PCS | Mod: ,,, | Performed by: INTERNAL MEDICINE

## 2023-08-02 ENCOUNTER — OUTSIDE PLACE OF SERVICE (OUTPATIENT)
Dept: NEPHROLOGY | Facility: CLINIC | Age: 73
End: 2023-08-02
Payer: MEDICARE

## 2023-08-02 PROCEDURE — 99222 PR INITIAL HOSPITAL CARE,LEVL II: ICD-10-PCS | Mod: ,,, | Performed by: INTERNAL MEDICINE

## 2023-08-02 PROCEDURE — 99222 1ST HOSP IP/OBS MODERATE 55: CPT | Mod: ,,, | Performed by: INTERNAL MEDICINE

## 2023-08-03 PROCEDURE — 99232 SBSQ HOSP IP/OBS MODERATE 35: CPT | Mod: ,,, | Performed by: INTERNAL MEDICINE

## 2023-08-03 PROCEDURE — 99232 PR SUBSEQUENT HOSPITAL CARE,LEVL II: ICD-10-PCS | Mod: ,,, | Performed by: INTERNAL MEDICINE

## 2023-08-04 ENCOUNTER — OUTSIDE PLACE OF SERVICE (OUTPATIENT)
Dept: NEPHROLOGY | Facility: CLINIC | Age: 73
End: 2023-08-04
Payer: MEDICARE

## 2023-08-04 PROCEDURE — 99232 SBSQ HOSP IP/OBS MODERATE 35: CPT | Mod: ,,, | Performed by: INTERNAL MEDICINE

## 2023-08-04 PROCEDURE — 99232 PR SUBSEQUENT HOSPITAL CARE,LEVL II: ICD-10-PCS | Mod: ,,, | Performed by: INTERNAL MEDICINE

## 2023-08-05 PROCEDURE — 99232 SBSQ HOSP IP/OBS MODERATE 35: CPT | Mod: ,,, | Performed by: INTERNAL MEDICINE

## 2023-08-05 PROCEDURE — 99232 PR SUBSEQUENT HOSPITAL CARE,LEVL II: ICD-10-PCS | Mod: ,,, | Performed by: INTERNAL MEDICINE

## 2023-08-06 PROCEDURE — 99232 SBSQ HOSP IP/OBS MODERATE 35: CPT | Mod: ,,, | Performed by: INTERNAL MEDICINE

## 2023-08-06 PROCEDURE — 99232 PR SUBSEQUENT HOSPITAL CARE,LEVL II: ICD-10-PCS | Mod: ,,, | Performed by: INTERNAL MEDICINE

## 2023-08-07 ENCOUNTER — OUTSIDE PLACE OF SERVICE (OUTPATIENT)
Dept: NEPHROLOGY | Facility: CLINIC | Age: 73
End: 2023-08-07
Payer: MEDICARE

## 2023-08-07 PROCEDURE — 99232 SBSQ HOSP IP/OBS MODERATE 35: CPT | Mod: ,,, | Performed by: INTERNAL MEDICINE

## 2023-08-07 PROCEDURE — 99232 PR SUBSEQUENT HOSPITAL CARE,LEVL II: ICD-10-PCS | Mod: ,,, | Performed by: INTERNAL MEDICINE

## 2023-08-08 PROCEDURE — 99232 PR SUBSEQUENT HOSPITAL CARE,LEVL II: ICD-10-PCS | Mod: ,,, | Performed by: INTERNAL MEDICINE

## 2023-08-08 PROCEDURE — 99232 SBSQ HOSP IP/OBS MODERATE 35: CPT | Mod: ,,, | Performed by: INTERNAL MEDICINE

## 2023-08-09 PROCEDURE — 99232 PR SUBSEQUENT HOSPITAL CARE,LEVL II: ICD-10-PCS | Mod: ,,, | Performed by: INTERNAL MEDICINE

## 2023-08-09 PROCEDURE — 99232 SBSQ HOSP IP/OBS MODERATE 35: CPT | Mod: ,,, | Performed by: INTERNAL MEDICINE

## 2023-08-10 PROCEDURE — 99232 SBSQ HOSP IP/OBS MODERATE 35: CPT | Mod: ,,, | Performed by: INTERNAL MEDICINE

## 2023-08-10 PROCEDURE — 99232 PR SUBSEQUENT HOSPITAL CARE,LEVL II: ICD-10-PCS | Mod: ,,, | Performed by: INTERNAL MEDICINE

## 2023-08-11 PROCEDURE — 99232 SBSQ HOSP IP/OBS MODERATE 35: CPT | Mod: ,,, | Performed by: INTERNAL MEDICINE

## 2023-08-11 PROCEDURE — 99232 PR SUBSEQUENT HOSPITAL CARE,LEVL II: ICD-10-PCS | Mod: ,,, | Performed by: INTERNAL MEDICINE

## 2023-08-12 PROCEDURE — 99232 PR SUBSEQUENT HOSPITAL CARE,LEVL II: ICD-10-PCS | Mod: ,,, | Performed by: INTERNAL MEDICINE

## 2023-08-12 PROCEDURE — 99232 SBSQ HOSP IP/OBS MODERATE 35: CPT | Mod: ,,, | Performed by: INTERNAL MEDICINE

## 2023-08-13 PROCEDURE — 99232 SBSQ HOSP IP/OBS MODERATE 35: CPT | Mod: ,,, | Performed by: INTERNAL MEDICINE

## 2023-08-13 PROCEDURE — 99232 PR SUBSEQUENT HOSPITAL CARE,LEVL II: ICD-10-PCS | Mod: ,,, | Performed by: INTERNAL MEDICINE

## 2023-08-15 PROCEDURE — 99232 PR SUBSEQUENT HOSPITAL CARE,LEVL II: ICD-10-PCS | Mod: ,,, | Performed by: INTERNAL MEDICINE

## 2023-08-15 PROCEDURE — 99232 SBSQ HOSP IP/OBS MODERATE 35: CPT | Mod: ,,, | Performed by: INTERNAL MEDICINE

## 2023-08-16 PROCEDURE — 99232 SBSQ HOSP IP/OBS MODERATE 35: CPT | Mod: ,,, | Performed by: INTERNAL MEDICINE

## 2023-08-16 PROCEDURE — 99232 PR SUBSEQUENT HOSPITAL CARE,LEVL II: ICD-10-PCS | Mod: ,,, | Performed by: INTERNAL MEDICINE

## 2023-08-17 ENCOUNTER — OUTSIDE PLACE OF SERVICE (OUTPATIENT)
Dept: NEPHROLOGY | Facility: CLINIC | Age: 73
End: 2023-08-17
Payer: MEDICARE

## 2023-08-17 PROCEDURE — 99233 PR SUBSEQUENT HOSPITAL CARE,LEVL III: ICD-10-PCS | Mod: ,,, | Performed by: INTERNAL MEDICINE

## 2023-08-17 PROCEDURE — 99233 SBSQ HOSP IP/OBS HIGH 50: CPT | Mod: ,,, | Performed by: INTERNAL MEDICINE

## 2023-08-18 PROCEDURE — 99233 SBSQ HOSP IP/OBS HIGH 50: CPT | Mod: ,,, | Performed by: INTERNAL MEDICINE

## 2023-08-18 PROCEDURE — 99233 PR SUBSEQUENT HOSPITAL CARE,LEVL III: ICD-10-PCS | Mod: ,,, | Performed by: INTERNAL MEDICINE

## 2023-08-19 ENCOUNTER — OUTSIDE PLACE OF SERVICE (OUTPATIENT)
Dept: NEPHROLOGY | Facility: CLINIC | Age: 73
End: 2023-08-19
Payer: MEDICARE

## 2023-08-21 ENCOUNTER — OUTSIDE PLACE OF SERVICE (OUTPATIENT)
Dept: NEPHROLOGY | Facility: CLINIC | Age: 73
End: 2023-08-21
Payer: MEDICARE